# Patient Record
Sex: MALE | Race: WHITE | NOT HISPANIC OR LATINO | Employment: FULL TIME | URBAN - METROPOLITAN AREA
[De-identification: names, ages, dates, MRNs, and addresses within clinical notes are randomized per-mention and may not be internally consistent; named-entity substitution may affect disease eponyms.]

---

## 2017-02-09 ENCOUNTER — THERAPY VISIT (OUTPATIENT)
Dept: PHYSICAL THERAPY | Facility: CLINIC | Age: 47
End: 2017-02-09
Payer: COMMERCIAL

## 2017-02-09 DIAGNOSIS — S86.012D RUPTURE OF ACHILLES TENDON, LEFT, SUBSEQUENT ENCOUNTER: Primary | ICD-10-CM

## 2017-02-09 PROCEDURE — 97110 THERAPEUTIC EXERCISES: CPT | Mod: GP | Performed by: PHYSICAL THERAPIST

## 2017-02-09 NOTE — PROGRESS NOTES
Subjective:    HPI                    Objective:    System    Physical Exam    General     ROS    Assessment/Plan:      PROGRESS  REPORT    Progress reporting period is from 02/09/17.       SUBJECTIVE  Subjective changes noted by patient:  L calf strength is improving. Biggest deficit is calf strength.He is here today to get HEP/workout plan organized to focus on core/LE strength and cardio. Able to walk 30-40 minutes painfree, just feels tired.     Current pain level is NA  .     Previous pain level was  : 0/10.   Changes in function:  Yes (See Goal flowsheet attached for changes in current functional level)  Adverse reaction to treatment or activity: None    OBJECTIVE  Changes noted in objective findings:    Objective: L calf ~90% of size R calf. Able to do single leg toe raise for 3-5 reps.Cued to invert foot slightly to bias medial gastroc.Rev'd HEP on PTRX and instructed to perform HEP 3 x week, and cardio 4-5 x week.      ASSESSMENT/PLAN  Updated problem list and treatment plan: Diagnosis 1:  S/p L achilles repair  Decreased ROM/flexibility - therapeutic exercise  Decreased strength - therapeutic exercise and therapeutic activities  Decreased proprioception - neuro re-education and therapeutic activities  Impaired gait - gait training  Impaired muscle performance - neuro re-education  Decreased function - therapeutic activities  STG/LTGs have been met or progress has been made towards goals:  Yes (See Goal flow sheet completed today.)  Assessment of Progress: The patient's condition is improving.  Self Management Plans:  Patient has been instructed in a home treatment program.  Patient  has been instructed in self management of symptoms.    Alfonso continues to require the following intervention to meet STG and LTG's:  PT    Recommendations:  This patient would benefit from continued therapy.     Frequency:  F/u 1 visit in 6 weeks  Duration:  for 2 visits        Please refer to the daily flowsheet for treatment  today, total treatment time and time spent performing 1:1 timed codes.

## 2017-02-13 ENCOUNTER — TRANSFERRED RECORDS (OUTPATIENT)
Dept: HEALTH INFORMATION MANAGEMENT | Facility: CLINIC | Age: 47
End: 2017-02-13

## 2019-11-04 ENCOUNTER — HEALTH MAINTENANCE LETTER (OUTPATIENT)
Age: 49
End: 2019-11-04

## 2020-07-29 ENCOUNTER — TRANSFERRED RECORDS (OUTPATIENT)
Dept: HEALTH INFORMATION MANAGEMENT | Facility: CLINIC | Age: 50
End: 2020-07-29

## 2020-08-03 ENCOUNTER — TRANSFERRED RECORDS (OUTPATIENT)
Dept: HEALTH INFORMATION MANAGEMENT | Facility: CLINIC | Age: 50
End: 2020-08-03

## 2020-08-04 ENCOUNTER — HOSPITAL ENCOUNTER (OUTPATIENT)
Dept: ULTRASOUND IMAGING | Facility: CLINIC | Age: 50
Discharge: HOME | End: 2020-08-04
Attending: INTERNAL MEDICINE | Admitting: INTERNAL MEDICINE
Payer: COMMERCIAL

## 2020-08-04 DIAGNOSIS — C91.40 HAIRY CELL LEUKEMIA (H): ICD-10-CM

## 2020-08-04 PROCEDURE — 76705 ECHO EXAM OF ABDOMEN: CPT

## 2020-08-10 DIAGNOSIS — Z11.59 ENCOUNTER FOR SCREENING FOR OTHER VIRAL DISEASES: Primary | ICD-10-CM

## 2020-08-14 DIAGNOSIS — Z11.59 ENCOUNTER FOR SCREENING FOR OTHER VIRAL DISEASES: ICD-10-CM

## 2020-08-14 PROCEDURE — U0003 INFECTIOUS AGENT DETECTION BY NUCLEIC ACID (DNA OR RNA); SEVERE ACUTE RESPIRATORY SYNDROME CORONAVIRUS 2 (SARS-COV-2) (CORONAVIRUS DISEASE [COVID-19]), AMPLIFIED PROBE TECHNIQUE, MAKING USE OF HIGH THROUGHPUT TECHNOLOGIES AS DESCRIBED BY CMS-2020-01-R: HCPCS | Performed by: RADIOLOGY

## 2020-08-15 LAB
SARS-COV-2 RNA SPEC QL NAA+PROBE: NOT DETECTED
SPECIMEN SOURCE: NORMAL

## 2020-08-17 ENCOUNTER — APPOINTMENT (OUTPATIENT)
Dept: INTERVENTIONAL RADIOLOGY/VASCULAR | Facility: CLINIC | Age: 50
End: 2020-08-17
Attending: RADIOLOGY
Payer: COMMERCIAL

## 2020-08-17 ENCOUNTER — HOSPITAL ENCOUNTER (OUTPATIENT)
Facility: CLINIC | Age: 50
Discharge: HOME | End: 2020-08-17
Attending: RADIOLOGY | Admitting: RADIOLOGY
Payer: COMMERCIAL

## 2020-08-17 VITALS
RESPIRATION RATE: 14 BRPM | DIASTOLIC BLOOD PRESSURE: 91 MMHG | SYSTOLIC BLOOD PRESSURE: 138 MMHG | WEIGHT: 258 LBS | HEART RATE: 78 BPM | TEMPERATURE: 97.8 F | HEIGHT: 73 IN | BODY MASS INDEX: 34.19 KG/M2

## 2020-08-17 DIAGNOSIS — C80.1 CANCER (H): ICD-10-CM

## 2020-08-17 LAB
ERYTHROCYTE [DISTWIDTH] IN BLOOD BY AUTOMATED COUNT: 14.2 % (ref 10–15)
HCT VFR BLD AUTO: 40.2 % (ref 40–53)
HGB BLD-MCNC: 13.2 G/DL (ref 13.3–17.7)
MCH RBC QN AUTO: 32.8 PG (ref 26.5–33)
MCHC RBC AUTO-ENTMCNC: 32.8 G/DL (ref 31.5–36.5)
MCV RBC AUTO: 100 FL (ref 78–100)
PLATELET # BLD AUTO: 56 10E9/L (ref 150–450)
RBC # BLD AUTO: 4.02 10E12/L (ref 4.4–5.9)
WBC # BLD AUTO: 4.7 10E9/L (ref 4–11)

## 2020-08-17 PROCEDURE — 85027 COMPLETE CBC AUTOMATED: CPT | Performed by: RADIOLOGY

## 2020-08-17 PROCEDURE — 77001 FLUOROGUIDE FOR VEIN DEVICE: CPT

## 2020-08-17 PROCEDURE — C1751 CATH, INF, PER/CENT/MIDLINE: HCPCS

## 2020-08-17 PROCEDURE — 76937 US GUIDE VASCULAR ACCESS: CPT

## 2020-08-17 PROCEDURE — 25000125 ZZHC RX 250: Performed by: RADIOLOGY

## 2020-08-17 PROCEDURE — 36569 INSJ PICC 5 YR+ W/O IMAGING: CPT

## 2020-08-17 RX ORDER — DEXTROSE MONOHYDRATE 25 G/50ML
25-50 INJECTION, SOLUTION INTRAVENOUS
Status: DISCONTINUED | OUTPATIENT
Start: 2020-08-17 | End: 2020-08-17 | Stop reason: HOSPADM

## 2020-08-17 RX ORDER — NICOTINE POLACRILEX 4 MG
15-30 LOZENGE BUCCAL
Status: CANCELLED | OUTPATIENT
Start: 2020-08-17

## 2020-08-17 RX ORDER — PROCHLORPERAZINE MALEATE 10 MG
10 TABLET ORAL EVERY 6 HOURS PRN
COMMUNITY
End: 2023-11-05

## 2020-08-17 RX ORDER — NICOTINE POLACRILEX 4 MG
15-30 LOZENGE BUCCAL
Status: DISCONTINUED | OUTPATIENT
Start: 2020-08-17 | End: 2020-08-17 | Stop reason: HOSPADM

## 2020-08-17 RX ORDER — LIDOCAINE 40 MG/G
CREAM TOPICAL
Status: DISCONTINUED | OUTPATIENT
Start: 2020-08-17 | End: 2020-08-17 | Stop reason: HOSPADM

## 2020-08-17 RX ORDER — DEXTROSE MONOHYDRATE 25 G/50ML
25-50 INJECTION, SOLUTION INTRAVENOUS
Status: CANCELLED | OUTPATIENT
Start: 2020-08-17

## 2020-08-17 RX ORDER — ALLOPURINOL 300 MG/1
300 TABLET ORAL DAILY
COMMUNITY
End: 2020-09-15

## 2020-08-17 RX ORDER — CEFAZOLIN SODIUM IN 0.9 % NACL 3 G/100 ML
3 INTRAVENOUS SOLUTION, PIGGYBACK (ML) INTRAVENOUS
Status: DISCONTINUED | OUTPATIENT
Start: 2020-08-17 | End: 2020-08-17 | Stop reason: CLARIF

## 2020-08-17 RX ORDER — LIDOCAINE HYDROCHLORIDE 10 MG/ML
1-30 INJECTION, SOLUTION EPIDURAL; INFILTRATION; INTRACAUDAL; PERINEURAL
Status: COMPLETED | OUTPATIENT
Start: 2020-08-17 | End: 2020-08-17

## 2020-08-17 RX ORDER — LIDOCAINE HYDROCHLORIDE 10 MG/ML
INJECTION, SOLUTION EPIDURAL; INFILTRATION; INTRACAUDAL; PERINEURAL
Status: DISCONTINUED
Start: 2020-08-17 | End: 2020-08-17 | Stop reason: HOSPADM

## 2020-08-17 RX ORDER — SULFAMETHOXAZOLE/TRIMETHOPRIM 800-160 MG
1 TABLET ORAL DAILY
Status: ON HOLD | COMMUNITY
End: 2020-09-21

## 2020-08-17 RX ADMIN — LIDOCAINE HYDROCHLORIDE 3 ML: 10 INJECTION, SOLUTION EPIDURAL; INFILTRATION; INTRACAUDAL; PERINEURAL at 09:20

## 2020-08-17 ASSESSMENT — MIFFLIN-ST. JEOR: SCORE: 2089.16

## 2020-08-17 NOTE — DISCHARGE INSTRUCTIONS
Discharge Instructions: Caring for Your Central Line  You are going home with a central line. It s also called a central venous access device (CVAD) or central venous catheter (CVC). A small, soft tube (catheter) has been put in a vein that leads to your heart. This provides medicine during your treatment. It is taken out when you no longer need it. At home, you need to take care of your central line to keep it working. A central line has a high infection risk. So you must take extra care washing your hands and preventing the spread of germs. This sheet will help you remember what to do at home.  Understanding your role    A nurse or other healthcare provider will teach you and your caregivers how to care for the central line. Before leaving the hospital, make sure you understand what to do at home, how long you may need the central line, and when to have a follow-up visit.    You will likely be told to flush the central line with saline or heparin solution. You may also be told to change the catheter s injection caps and change the bandage (dressing). Or, a nurse may do this for you during a follow-up visit. Only do these things if you re told to do so. Follow the instructions you were given.    Protecting the central line  If the central line gets damaged, it won t work right and could raise your chance of infection. Call your healthcare team right away if any damage occurs. To protect the central line at home:    Prevent infection. Use good hand hygiene by following the guidelines on this sheet. Don t touch the catheter or dressing unless you need to. And always clean your hands before and after you come in contact with any part of the central line. Your caregivers, family members, and any visitors should use good hand hygiene, too.    Keep the central line dry. The catheter and dressing must stay dry. Don t take baths, go swimming, use a hot tub, or do other activities that could get the central line wet. Take  a sponge bath to avoid getting the central line wet, unless your healthcare provider tells you otherwise. Ask your provider about the best way to keep the line dry when bathing or showering. If the dressing does get wet, change it only if you have been shown how. Otherwise, call your healthcare team right away for help. Have clean or sterile gloves available if you will be changing the dressings over the catheter.    Avoid damage. Don t use any sharp or pointy objects around the catheter. This includes scissors, pins, knives, razors, or anything else that could cut it or put a hole in it (puncture it). Also, don t let anything pull or rub on the catheter, such as clothing.    Watch for signs of problems. Pay attention to how much of the catheter sticks out from your skin. If this changes at all, let your healthcare provider know. Also watch for cracks, leaks, or other damage. If the dressing becomes dirty, loose, or wet, change it (if you have been instructed to). Or call your healthcare team right away.    Avoid lowering your chest below your waist. This includes bending at the waist to do things like tying your shoes. When your chest is below your waist, especially for a long time, the catheter s internal tip could slip out of place in the vein.    Tell your healthcare team if you vomit or have severe coughing. This can also make the catheter slip out of place.  Risk of blood clot  If a blood clot forms it can block blood flow through the vein where the catheter is placed. Signs of a blood clot include pain or swelling in your neck, face, chest or arm. If you have any of these symptoms, call your healthcare provider right away. You may need an ultrasound exam to find the blood clot. You may also be treated with a blood thinner.    A central line can let germs into your body. This can lead to serious and sometimes deadly infections. To prevent infection, it s very important that you, your caregivers, and others  around you use good hand hygiene. This means washing your hands well with soap and water, and cleaning them with alcohol-based hand gel as directed. Never touch the central line or dressing without first using one of these methods.  To wash your hands with soap and water:  1. Wet your hands with warm water. (Avoid hot water. It can cause skin irritation when you wash your hands often.)  2. Apply enough soap to cover the entire surface of your hands, including your fingers.  3. Rub your hands together briskly for at least 15 seconds. Make sure to rub the front and back of each hand up to the wrist, your fingers and fingernails, between the fingers, and each thumb.  4. Rinse your hands with warm water.  5. Dry your hands completely with a new, unused paper towel. Don t use a cloth towel or other reusable towel. These can harbor germs.  6. Use the paper towel to turn off the faucet, then throw it away. If you re in a bathroom, also use a paper towel to open the door instead of touching the handle.  When you don t have access to soap and water: Use alcohol-based hand gel to clean your hands. The gel should have at least 60% alcohol. Follow the instructions on the package. Your healthcare team can answer any questions you have about when to use hand gel, or when it s better to wash with soap and water.  Call your healthcare provider right away if you have any of the following:    Pain or burning in your shoulder, chest, back, arm, or leg    Fever of 100.4 F (38.0 C) or higher    Chills    Signs of infection at the catheter site (pain, redness, drainage, burning, or stinging)    Coughing, wheezing, or shortness of breath    A racing or irregular heartbeat    Muscle stiffness or trouble moving    Gurgling noises coming from the catheter    The catheter falls out, breaks, cracks, leaks, or has other damage  Date Last Reviewed: 7/1/2016 2000-2019 [employer]. [address] [place] [address]. All  rights reserved. This information is not intended as a substitute for professional medical care. Always follow your healthcare professional's instructions.

## 2020-08-17 NOTE — IP AVS SNAPSHOT
MRN:[id number]                      After Visit Summary   8/17/2020    Alfonso Kendrick    MRN: [id number]           Visit Information        Department      8/17/2020  7:45 AM [facility]          Review of your medicines      UNREVIEWED medicines. Ask your doctor about these medicines       Dose / Directions   Acetaminophen 325 MG Caps      Dose:  325-650 mg  Take 325-650 mg by mouth every 4 hours as needed  Refills:  0     allopurinol 300 MG tablet  Commonly known as:  ZYLOPRIM      Dose:  300 mg  Take 300 mg by mouth daily  Refills:  0     Multivitamin Tabs      Refills:  0     prochlorperazine 10 MG tablet  Commonly known as:  COMPAZINE      Dose:  10 mg  Take 10 mg by mouth every 6 hours as needed for nausea or vomiting  Refills:  0     sulfamethoxazole-trimethoprim 800-160 MG tablet  Commonly known as:  BACTRIM DS      Dose:  1 tablet  Take 1 tablet by mouth 2 times daily  Refills:  0        CONTINUE these medicines which have NOT CHANGED       Dose / Directions   * order for DME  Used for:  Achilles tendon injury, left, initial encounter      Equipment being ordered: walking boot  Quantity:  1 Units  Refills:  0     * order for DME  Used for:  Achilles tendon injury, left, initial encounter      Equipment being ordered: crutches  Quantity:  1 Units  Refills:  0     * order for DME  Used for:  Achilles rupture, left, subsequent encounter      Equipment being ordered: RollAbout x 3 months  Quantity:  1 Device  Refills:  0     * order for DME  Used for:  Achilles rupture, left, subsequent encounter      Equipment being ordered: shower seat  Quantity:  1 Device  Refills:  0     * order for DME  Used for:  S/P Achilles tendon repair      Equipment being ordered: triloc ankle brace  Quantity:  1 Device  Refills:  0         * This list has 5 medication(s) that are the same as other medications prescribed for you. Read the directions carefully, and ask your doctor or other care  provider to review them with you.                  Protect others around you: Learn how to safely use, store and throw away your medicines at www.disposemymeds.org.       Follow-ups after your visit       Care Instructions       Further instructions from your care team         Discharge Instructions: Caring for Your Central Line  You are going home with a central line. It s also called a central venous access device (CVAD) or central venous catheter (CVC). A small, soft tube (catheter) has been put in a vein that leads to your heart. This provides medicine during your treatment. It is taken out when you no longer need it. At home, you need to take care of your central line to keep it working. A central line has a high infection risk. So you must take extra care washing your hands and preventing the spread of germs. This sheet will help you remember what to do at home.  Understanding your role    A nurse or other healthcare provider will teach you and your caregivers how to care for the central line. Before leaving the hospital, make sure you understand what to do at home, how long you may need the central line, and when to have a follow-up visit.    You will likely be told to flush the central line with saline or heparin solution. You may also be told to change the catheter s injection caps and change the bandage (dressing). Or, a nurse may do this for you during a follow-up visit. Only do these things if you re told to do so. Follow the instructions you were given.    Protecting the central line  If the central line gets damaged, it won t work right and could raise your chance of infection. Call your healthcare team right away if any damage occurs. To protect the central line at home:    Prevent infection. Use good hand hygiene by following the guidelines on this sheet. Don t touch the catheter or dressing unless you need to. And always clean your hands before and after you come in contact with any part of the  central line. Your caregivers, family members, and any visitors should use good hand hygiene, too.    Keep the central line dry. The catheter and dressing must stay dry. Don t take baths, go swimming, use a hot tub, or do other activities that could get the central line wet. Take a sponge bath to avoid getting the central line wet, unless your healthcare provider tells you otherwise. Ask your provider about the best way to keep the line dry when bathing or showering. If the dressing does get wet, change it only if you have been shown how. Otherwise, call your healthcare team right away for help. Have clean or sterile gloves available if you will be changing the dressings over the catheter.    Avoid damage. Don t use any sharp or pointy objects around the catheter. This includes scissors, pins, knives, razors, or anything else that could cut it or put a hole in it (puncture it). Also, don t let anything pull or rub on the catheter, such as clothing.    Watch for signs of problems. Pay attention to how much of the catheter sticks out from your skin. If this changes at all, let your healthcare provider know. Also watch for cracks, leaks, or other damage. If the dressing becomes dirty, loose, or wet, change it (if you have been instructed to). Or call your healthcare team right away.    Avoid lowering your chest below your waist. This includes bending at the waist to do things like tying your shoes. When your chest is below your waist, especially for a long time, the catheter s internal tip could slip out of place in the vein.    Tell your healthcare team if you vomit or have severe coughing. This can also make the catheter slip out of place.  Risk of blood clot  If a blood clot forms it can block blood flow through the vein where the catheter is placed. Signs of a blood clot include pain or swelling in your neck, face, chest or arm. If you have any of these symptoms, call your healthcare provider right away. You may  need an ultrasound exam to find the blood clot. You may also be treated with a blood thinner.    A central line can let germs into your body. This can lead to serious and sometimes deadly infections. To prevent infection, it s very important that you, your caregivers, and others around you use good hand hygiene. This means washing your hands well with soap and water, and cleaning them with alcohol-based hand gel as directed. Never touch the central line or dressing without first using one of these methods.  To wash your hands with soap and water:  1. Wet your hands with warm water. (Avoid hot water. It can cause skin irritation when you wash your hands often.)  2. Apply enough soap to cover the entire surface of your hands, including your fingers.  3. Rub your hands together briskly for at least 15 seconds. Make sure to rub the front and back of each hand up to the wrist, your fingers and fingernails, between the fingers, and each thumb.  4. Rinse your hands with warm water.  5. Dry your hands completely with a new, unused paper towel. Don t use a cloth towel or other reusable towel. These can harbor germs.  6. Use the paper towel to turn off the faucet, then throw it away. If you re in a bathroom, also use a paper towel to open the door instead of touching the handle.  When you don t have access to soap and water: Use alcohol-based hand gel to clean your hands. The gel should have at least 60% alcohol. Follow the instructions on the package. Your healthcare team can answer any questions you have about when to use hand gel, or when it s better to wash with soap and water.  Call your healthcare provider right away if you have any of the following:    Pain or burning in your shoulder, chest, back, arm, or leg    Fever of 100.4 F (38.0 C) or higher    Chills    Signs of infection at the catheter site (pain, redness, drainage, burning, or stinging)    Coughing, wheezing, or shortness of breath    A racing or irregular  "heartbeat    Muscle stiffness or trouble moving    Gurgling noises coming from the catheter    The catheter falls out, breaks, cracks, leaks, or has other damage  Date Last Reviewed: 7/1/2016 2000-2019 [employer]. [address] [place] [address], [place] [address]. All rights reserved. This information is not intended as a substitute for professional medical care. Always follow your healthcare professional's instructions.          Additional Information About Your Visit       MyChart Information    [employer] gives you secure access to your electronic health record. If you see a primary care provider, you can also send messages to your care team and make appointments. If you have questions, please call your primary care clinic.  If you do not have a primary care provider, please call [phone] and they will assist you.       Care EveryWhere ID    This is your Care EveryWhere ID. This could be used by other organizations to access your [facility] medical records  [id number]       Your Vitals Were  Most recent update: 8/17/2020  8:21 AM    Blood Pressure   138/91   (BP Location: Right arm)          Pulse   78          Temperature   97.8  F (36.6  C) (Temporal)          Respirations   14          Height   1.854 m (6' 1\")             Weight   117 kg (258 lb)    BMI (Body Mass Index)   34.04 kg/m           Primary Care Provider Office Phone # Fax #    Chuy Guzman [phone]      Equal Access to Services    [facility]: Hadii evelyn ku hadasho Soomaali, waaxda luqadaha, qaybta kaalmada adeegyada, lenard iglesias . So [facility] [phone].    ATENCIÓN: Si habla español, tiene a west disposición servicios gratuitos de asistencia lingüística. Ajay al [phone].    We comply with applicable federal and state civil rights laws, including the [place] Human Rights Act. We do not discriminate on the basis of race, color, creed, Oriental orthodox, national origin, marital status, " age, disability, sex, sexual orientation, or gender identity.       Thank you!    Thank you for choosing [facility] for your care. Our goal is always to provide you with excellent care. Hearing back from our patients is one way we can continue to improve our services. Please take a few minutes to complete the written survey that you may receive in the mail after you visit. If you would like to speak to someone directly about your visit please contact Patient Relations at [phone]. Thank you!              Medication List      Medications          Morning Afternoon Evening Bedtime As Needed    * order for DME  INSTRUCTIONS:  Equipment being ordered: walking boot                     * order for DME  INSTRUCTIONS:  Equipment being ordered: crutches                     * order for DME  INSTRUCTIONS:  Equipment being ordered: RollAbout x 3 months                     * order for DME  INSTRUCTIONS:  Equipment being ordered: shower seat                     * order for DME  INSTRUCTIONS:  Equipment being ordered: triloc ankle brace                        * This list has 5 medication(s) that are the same as other medications prescribed for you. Read the directions carefully, and ask your doctor or other care provider to review them with you.            ASK your doctor about these medications          Morning Afternoon Evening Bedtime As Needed    Acetaminophen 325 MG Caps  INSTRUCTIONS:  Take 325-650 mg by mouth every 4 hours as needed                     allopurinol 300 MG tablet  Also known as:  ZYLOPRIM  INSTRUCTIONS:  Take 300 mg by mouth daily                     Multivitamin Tabs                     prochlorperazine 10 MG tablet  Also known as:  COMPAZINE  INSTRUCTIONS:  Take 10 mg by mouth every 6 hours as needed for nausea or vomiting                     sulfamethoxazole-trimethoprim 800-160 MG tablet  Also known as:  BACTRIM DS  INSTRUCTIONS:  Take 1 tablet by mouth 2 times daily

## 2020-08-17 NOTE — IP AVS SNAPSHOT
[facility]  [address]  [facility] [address]  Phone:  [phone]                                    After Visit Summary   8/17/2020    Alfonso Kendrick    MRN: [id number]           After Visit Summary Signature Page    I have received my discharge instructions, and my questions have been answered. I have discussed any challenges I see with this plan with the nurse or doctor.    ..........................................................................................................................................  Patient/Patient Representative Signature      ..........................................................................................................................................  Patient Representative Print Name and Relationship to Patient    ..................................................               ................................................  Date                                   Time    ..........................................................................................................................................  Reviewed by Signature/Title    ...................................................              ..............................................  Date                                               Time          22EPIC Rev 08/18

## 2020-08-17 NOTE — SEDATION DOCUMENTATION
After patient got dressed, picc site had a little ooze from insertion site. Slight manual pressure held, dressing changed and coban applied to outer sterile dressing for enforcement. Patient denies any pain or discomfort. Arm circumference remains the same 36cm. Patient stated he would leave the dressing as is until his appointment on Wednesday. No further questions at this time. Patient has follow up phones as needed. Discharged to home with daughter.

## 2020-08-17 NOTE — PROCEDURES
[facility]    Procedure: RUE PICC    Date/Time: 8/17/2020 9:43 AM  Performed by: Saurabh Mitchell MD  Authorized by: Saurabh Mitchell MD     UNIVERSAL PROTOCOL   Site Marked: NA  Prior Images Obtained and Reviewed:  Yes  Required items: Required blood products, implants, devices and special equipment available    Patient identity confirmed:  Verbally with patient, arm band, provided demographic data and hospital-assigned identification number  Patient was reevaluated immediately before administering moderate or deep sedation or anesthesia  Confirmation Checklist:  Patient's identity using two indicators, relevant allergies, procedure was appropriate and matched the consent or emergent situation and correct equipment/implants were available  Time out: Immediately prior to the procedure a time out was called    Universal Protocol: the Joint Commission Universal Protocol was followed    Preparation: Patient was prepped and draped in usual sterile fashion    ESBL (mL):  2         ANESTHESIA    Anesthesia: Local infiltration  Local Anesthetic:  Lidocaine 1% without epinephrine      SEDATION    Patient Sedated: No    See dictated procedure note for full details.  Findings: RUE dual lumen Power PICC with tip at the RA/SVC Junction.  Both lumens aspirated and locked with normal saline.  No complications.  May use immediately.    Specimens: none    Complications: None    Condition: Stable    PROCEDURE   Patient Tolerance:  Patient tolerated the procedure well with no immediate complications    Length of time physician/provider present for 1:1 monitoring during sedation: 0

## 2020-08-19 ENCOUNTER — TRANSFERRED RECORDS (OUTPATIENT)
Dept: HEALTH INFORMATION MANAGEMENT | Facility: CLINIC | Age: 50
End: 2020-08-19

## 2020-08-27 ENCOUNTER — TELEPHONE (OUTPATIENT)
Dept: FAMILY MEDICINE | Facility: CLINIC | Age: 50
End: 2020-08-27

## 2020-08-27 DIAGNOSIS — Z20.822 SUSPECTED COVID-19 VIRUS INFECTION: Primary | ICD-10-CM

## 2020-08-27 NOTE — TELEPHONE ENCOUNTER
Ordered PCR.  We should see him virtually when able to touch base with his recent recurrence of Hairy Cell Leukemia.  SB#3 with new Dx.    Make sure he is not having ongoing fever - if having more fevers with low WBC he may need to go to hospital for workup and treatment of neutropenic fever - I don't know what his current status is in terms of his cancer/chemo treatment.  Any recent notes would be helpful - can place on my desk for review if obtained from [facility].

## 2020-08-27 NOTE — TELEPHONE ENCOUNTER
Tamara from [facility] is calling to see if Dr. Guzman will order a Covid test for patient. Patient has been seen by them and his symptoms were fevers, sweating, and  chills. She states when they saw patient his temperature was 98.8 but patient states when he was taking his temperature at home it was 101. They would order the test but states it needs to come from patient's primary provider. Dr. Guzman hasn't seen patient since 06/1/2016    Tamara can be reached at [phone].    Mary Lemons

## 2020-08-28 NOTE — TELEPHONE ENCOUNTER
Let's do the 1:00 Same day on 9/4/2020 virtual ok, just make sure we get his [facility] notes for review at that visit in PVP planning.

## 2020-08-28 NOTE — TELEPHONE ENCOUNTER
Patient has had ongoing fevers but the oncologist wants him to stay out of the hospital if possible due to higher risk of infection. Patient states he goes to see his oncologist on Tuesday and Wednesday of next week. Records are in media of patient chart from oncology.    Patient would like to set up virtual visit with Chuy Guzman next week but only slots available are same day or short approval required slots.    Please advise.    Sofya BOURGEOIS RN, BSN

## 2020-08-29 DIAGNOSIS — Z20.822 SUSPECTED COVID-19 VIRUS INFECTION: Primary | ICD-10-CM

## 2020-09-15 ENCOUNTER — APPOINTMENT (OUTPATIENT)
Dept: GENERAL RADIOLOGY | Facility: CLINIC | Age: 50
DRG: 808 | End: 2020-09-15
Attending: PHYSICIAN ASSISTANT
Payer: COMMERCIAL

## 2020-09-15 ENCOUNTER — HOSPITAL ENCOUNTER (INPATIENT)
Facility: CLINIC | Age: 50
LOS: 6 days | Discharge: HOME | DRG: 808 | End: 2020-09-21
Attending: PHYSICIAN ASSISTANT | Admitting: INTERNAL MEDICINE
Payer: COMMERCIAL

## 2020-09-15 DIAGNOSIS — D70.9 NEUTROPENIC FEVER (H): ICD-10-CM

## 2020-09-15 DIAGNOSIS — R50.81 NEUTROPENIC FEVER (H): ICD-10-CM

## 2020-09-15 DIAGNOSIS — C91.40 HAIRY CELL LEUKEMIA (H): ICD-10-CM

## 2020-09-15 DIAGNOSIS — D70.9 FEBRILE NEUTROPENIA (H): Primary | ICD-10-CM

## 2020-09-15 DIAGNOSIS — R50.81 FEBRILE NEUTROPENIA (H): Primary | ICD-10-CM

## 2020-09-15 LAB
ALBUMIN SERPL-MCNC: 2.6 G/DL (ref 3.4–5)
ALBUMIN UR-MCNC: 300 MG/DL
ALP SERPL-CCNC: 64 U/L (ref 40–150)
ALT SERPL W P-5'-P-CCNC: 40 U/L (ref 0–70)
ANION GAP SERPL CALCULATED.3IONS-SCNC: 5 MMOL/L (ref 3–14)
APPEARANCE UR: CLEAR
APTT PPP: 46 SEC (ref 22–37)
AST SERPL W P-5'-P-CCNC: 76 U/L (ref 0–45)
BILIRUB SERPL-MCNC: 0.7 MG/DL (ref 0.2–1.3)
BILIRUB UR QL STRIP: NEGATIVE
BUN SERPL-MCNC: 24 MG/DL (ref 7–30)
CALCIUM SERPL-MCNC: 8.3 MG/DL (ref 8.5–10.1)
CHLORIDE SERPL-SCNC: 102 MMOL/L (ref 94–109)
CO2 SERPL-SCNC: 25 MMOL/L (ref 20–32)
COLOR UR AUTO: YELLOW
CREAT SERPL-MCNC: 1.21 MG/DL (ref 0.66–1.25)
D DIMER PPP FEU-MCNC: 12.8 UG/ML FEU (ref 0–0.5)
DIFFERENTIAL METHOD BLD: ABNORMAL
ERYTHROCYTE [DISTWIDTH] IN BLOOD BY AUTOMATED COUNT: 12.5 % (ref 10–15)
FIBRINOGEN PPP-MCNC: 643 MG/DL (ref 200–420)
GFR SERPL CREATININE-BSD FRML MDRD: 70 ML/MIN/{1.73_M2}
GLUCOSE SERPL-MCNC: 145 MG/DL (ref 70–99)
GLUCOSE UR STRIP-MCNC: 30 MG/DL
HCT VFR BLD AUTO: 27 % (ref 40–53)
HGB BLD-MCNC: 9.2 G/DL (ref 13.3–17.7)
HGB UR QL STRIP: ABNORMAL
INR PPP: 1.79 (ref 0.86–1.14)
KETONES UR STRIP-MCNC: ABNORMAL MG/DL
LACTATE BLD-SCNC: 1.6 MMOL/L (ref 0.7–2)
LEUKOCYTE ESTERASE UR QL STRIP: NEGATIVE
MCH RBC QN AUTO: 33 PG (ref 26.5–33)
MCHC RBC AUTO-ENTMCNC: 34.1 G/DL (ref 31.5–36.5)
MCV RBC AUTO: 97 FL (ref 78–100)
MUCOUS THREADS #/AREA URNS LPF: PRESENT /LPF
NITRATE UR QL: NEGATIVE
PH UR STRIP: 6 PH (ref 5–7)
PLATELET # BLD AUTO: 86 10E9/L (ref 150–450)
POTASSIUM SERPL-SCNC: 4.1 MMOL/L (ref 3.4–5.3)
PROT SERPL-MCNC: 6.4 G/DL (ref 6.8–8.8)
RBC # BLD AUTO: 2.79 10E12/L (ref 4.4–5.9)
RBC #/AREA URNS AUTO: 1 /HPF (ref 0–2)
SARS-COV-2 RNA SPEC QL NAA+PROBE: NORMAL
SODIUM SERPL-SCNC: 132 MMOL/L (ref 133–144)
SOURCE: ABNORMAL
SP GR UR STRIP: 1.03 (ref 1–1.03)
SPECIMEN SOURCE: NORMAL
UROBILINOGEN UR STRIP-MCNC: 2 MG/DL (ref 0–2)
WBC # BLD AUTO: 0.2 10E9/L (ref 4–11)
WBC #/AREA URNS AUTO: 4 /HPF (ref 0–5)

## 2020-09-15 PROCEDURE — 96365 THER/PROPH/DIAG IV INF INIT: CPT

## 2020-09-15 PROCEDURE — 99223 1ST HOSP IP/OBS HIGH 75: CPT | Mod: AI | Performed by: INTERNAL MEDICINE

## 2020-09-15 PROCEDURE — 25000128 H RX IP 250 OP 636: Performed by: PHYSICIAN ASSISTANT

## 2020-09-15 PROCEDURE — 87040 BLOOD CULTURE FOR BACTERIA: CPT | Performed by: PHYSICIAN ASSISTANT

## 2020-09-15 PROCEDURE — 81001 URINALYSIS AUTO W/SCOPE: CPT | Performed by: PHYSICIAN ASSISTANT

## 2020-09-15 PROCEDURE — 93005 ELECTROCARDIOGRAM TRACING: CPT

## 2020-09-15 PROCEDURE — 85384 FIBRINOGEN ACTIVITY: CPT | Performed by: PHYSICIAN ASSISTANT

## 2020-09-15 PROCEDURE — 83605 ASSAY OF LACTIC ACID: CPT | Performed by: PHYSICIAN ASSISTANT

## 2020-09-15 PROCEDURE — 96361 HYDRATE IV INFUSION ADD-ON: CPT

## 2020-09-15 PROCEDURE — 85610 PROTHROMBIN TIME: CPT | Performed by: PHYSICIAN ASSISTANT

## 2020-09-15 PROCEDURE — C9803 HOPD COVID-19 SPEC COLLECT: HCPCS

## 2020-09-15 PROCEDURE — 85379 FIBRIN DEGRADATION QUANT: CPT | Performed by: PHYSICIAN ASSISTANT

## 2020-09-15 PROCEDURE — 12000000 ZZH R&B MED SURG/OB

## 2020-09-15 PROCEDURE — U0003 INFECTIOUS AGENT DETECTION BY NUCLEIC ACID (DNA OR RNA); SEVERE ACUTE RESPIRATORY SYNDROME CORONAVIRUS 2 (SARS-COV-2) (CORONAVIRUS DISEASE [COVID-19]), AMPLIFIED PROBE TECHNIQUE, MAKING USE OF HIGH THROUGHPUT TECHNOLOGIES AS DESCRIBED BY CMS-2020-01-R: HCPCS | Performed by: PHYSICIAN ASSISTANT

## 2020-09-15 PROCEDURE — 96375 TX/PRO/DX INJ NEW DRUG ADDON: CPT

## 2020-09-15 PROCEDURE — 99285 EMERGENCY DEPT VISIT HI MDM: CPT | Mod: 25

## 2020-09-15 PROCEDURE — 80053 COMPREHEN METABOLIC PANEL: CPT | Performed by: PHYSICIAN ASSISTANT

## 2020-09-15 PROCEDURE — 96367 TX/PROPH/DG ADDL SEQ IV INF: CPT

## 2020-09-15 PROCEDURE — 36415 COLL VENOUS BLD VENIPUNCTURE: CPT | Performed by: PHYSICIAN ASSISTANT

## 2020-09-15 PROCEDURE — 85730 THROMBOPLASTIN TIME PARTIAL: CPT | Performed by: PHYSICIAN ASSISTANT

## 2020-09-15 PROCEDURE — 71045 X-RAY EXAM CHEST 1 VIEW: CPT

## 2020-09-15 PROCEDURE — 25800030 ZZH RX IP 258 OP 636: Performed by: PHYSICIAN ASSISTANT

## 2020-09-15 PROCEDURE — 96366 THER/PROPH/DIAG IV INF ADDON: CPT

## 2020-09-15 PROCEDURE — 85027 COMPLETE CBC AUTOMATED: CPT

## 2020-09-15 RX ORDER — METHYLPREDNISOLONE 4 MG
TABLET, DOSE PACK ORAL SEE ADMIN INSTRUCTIONS
COMMUNITY
End: 2023-11-05

## 2020-09-15 RX ORDER — ACYCLOVIR 400 MG/1
400 TABLET ORAL EVERY 12 HOURS
COMMUNITY
End: 2023-11-05

## 2020-09-15 RX ORDER — LANOLIN ALCOHOL/MO/W.PET/CERES
3 CREAM (GRAM) TOPICAL
Status: DISCONTINUED | OUTPATIENT
Start: 2020-09-15 | End: 2020-09-21 | Stop reason: HOSPADM

## 2020-09-15 RX ORDER — ONDANSETRON 2 MG/ML
4 INJECTION INTRAMUSCULAR; INTRAVENOUS EVERY 6 HOURS PRN
Status: DISCONTINUED | OUTPATIENT
Start: 2020-09-15 | End: 2020-09-21 | Stop reason: HOSPADM

## 2020-09-15 RX ORDER — LIDOCAINE 40 MG/G
CREAM TOPICAL
Status: DISCONTINUED | OUTPATIENT
Start: 2020-09-15 | End: 2020-09-21 | Stop reason: HOSPADM

## 2020-09-15 RX ORDER — NALOXONE HYDROCHLORIDE 0.4 MG/ML
.1-.4 INJECTION, SOLUTION INTRAMUSCULAR; INTRAVENOUS; SUBCUTANEOUS
Status: DISCONTINUED | OUTPATIENT
Start: 2020-09-15 | End: 2020-09-21 | Stop reason: HOSPADM

## 2020-09-15 RX ORDER — ACETAMINOPHEN 325 MG/1
650 TABLET ORAL EVERY 4 HOURS PRN
Status: DISCONTINUED | OUTPATIENT
Start: 2020-09-15 | End: 2020-09-16

## 2020-09-15 RX ORDER — ONDANSETRON 4 MG/1
4 TABLET, ORALLY DISINTEGRATING ORAL EVERY 6 HOURS PRN
Status: DISCONTINUED | OUTPATIENT
Start: 2020-09-15 | End: 2020-09-21 | Stop reason: HOSPADM

## 2020-09-15 RX ORDER — CEFAZOLIN SODIUM 1 G/50ML
1750 SOLUTION INTRAVENOUS EVERY 12 HOURS
Status: DISCONTINUED | OUTPATIENT
Start: 2020-09-16 | End: 2020-09-17

## 2020-09-15 RX ORDER — TEMAZEPAM 15 MG/1
15 CAPSULE ORAL
COMMUNITY
End: 2023-11-05

## 2020-09-15 RX ORDER — MULTIVIT-MIN/IRON/FOLIC ACID/K 18-600-40
1 CAPSULE ORAL DAILY
COMMUNITY
End: 2023-11-05

## 2020-09-15 RX ORDER — POLYETHYLENE GLYCOL 3350 17 G/17G
17 POWDER, FOR SOLUTION ORAL DAILY PRN
Status: DISCONTINUED | OUTPATIENT
Start: 2020-09-15 | End: 2020-09-21 | Stop reason: HOSPADM

## 2020-09-15 RX ORDER — LACTOBACILLUS RHAMNOSUS GG 10B CELL
1 CAPSULE ORAL DAILY
COMMUNITY
End: 2023-11-05

## 2020-09-15 RX ORDER — SODIUM CHLORIDE AND POTASSIUM CHLORIDE 150; 900 MG/100ML; MG/100ML
INJECTION, SOLUTION INTRAVENOUS CONTINUOUS
Status: DISCONTINUED | OUTPATIENT
Start: 2020-09-15 | End: 2020-09-18

## 2020-09-15 RX ORDER — KETOROLAC TROMETHAMINE 15 MG/ML
15 INJECTION, SOLUTION INTRAMUSCULAR; INTRAVENOUS ONCE
Status: COMPLETED | OUTPATIENT
Start: 2020-09-15 | End: 2020-09-15

## 2020-09-15 RX ORDER — LEVOFLOXACIN 500 MG/1
500 TABLET, FILM COATED ORAL DAILY
Status: ON HOLD | COMMUNITY
End: 2020-09-21

## 2020-09-15 RX ADMIN — VANCOMYCIN HYDROCHLORIDE 2500 MG: 1 INJECTION, POWDER, LYOPHILIZED, FOR SOLUTION INTRAVENOUS at 20:03

## 2020-09-15 RX ADMIN — TAZOBACTAM SODIUM AND PIPERACILLIN SODIUM 4.5 G: 500; 4 INJECTION, SOLUTION INTRAVENOUS at 19:04

## 2020-09-15 RX ADMIN — SODIUM CHLORIDE 1000 ML: 9 INJECTION, SOLUTION INTRAVENOUS at 17:35

## 2020-09-15 RX ADMIN — KETOROLAC TROMETHAMINE 15 MG: 15 INJECTION, SOLUTION INTRAMUSCULAR; INTRAVENOUS at 19:01

## 2020-09-15 ASSESSMENT — MIFFLIN-ST. JEOR
SCORE: 2042.44
SCORE: 2027.92

## 2020-09-15 ASSESSMENT — ENCOUNTER SYMPTOMS
RHINORRHEA: 1
VOMITING: 0
NAUSEA: 0
SHORTNESS OF BREATH: 0
FEVER: 1

## 2020-09-15 NOTE — ED NOTES
.  [facility]  ED Nurse Handoff Report    Alfonso Kendrick is a 50 year old male   ED Chief complaint: Fever  . ED Diagnosis:   Final diagnoses:   None     Allergies: No Known Allergies    Code Status: Full Code  Activity level - Baseline/Home:  Independent. Activity Level - Current:   Stand by Assist. Lift room needed: No. Bariatric: No   Needed: No   Isolation: Yes. Infection: Other .     Vital Signs:   Vitals:    09/15/20 1800 09/15/20 1815 09/15/20 1830 09/15/20 1843   BP: 115/72 113/70     Pulse: 126 123 126    Resp: 27 29 24    Temp:    103  F (39.4  C)   TempSrc:    Oral   SpO2: 99% 99% 99%    Weight:       Height:           Cardiac Rhythm:  ,      Pain level: 0-10 Pain Scale: 0  Patient confused: No. Patient Falls Risk: Yes.   Elimination Status: Has voided   Patient Report - Initial Complaint:Alfonso Kendrick is a 50 year old male on Eliquis with a history of hairy cell leukemia who presents with fever. The patient complains of a fever and rhinorrhea that began on Saturday (09/12/2020). The patient has been treating the symptoms with Tylenol every four hours, with last dose at 1400. He is unable to take ibuprofen due to blood thinner use. He is currently undergoing chemotherapy for hairy cell leukemia, with his last treatment on Friday (09/11/2020). His oncologist possibly attributes an additional symptom of generalized rash to the treatment. This began last Wednesday (09/09/2020). Denies any shortness of breath, chest pain, cough, abdominal pain, nausea, vomiting, or urinary symptoms.  . Focused Assessment: Patient is neutropenic with a fever. Neutropenic precautions please. In the ER pt is alert and oriented times four. Pleasant and cooperative. Continues to be febrile. Given po. PICC line is at NS TKO 50cc/hour.   Tests Performed: .  Abnormal Labs Reviewed   CBC WITH PLATELETS DIFFERENTIAL - Abnormal; Notable for the following components:       Result Value    WBC 0.2 (*)     RBC  Count 2.79 (*)     Hemoglobin 9.2 (*)     Hematocrit 27.0 (*)     Platelet Count 86 (*)     All other components within normal limits   COMPREHENSIVE METABOLIC PANEL - Abnormal; Notable for the following components:    Sodium 132 (*)     Glucose 145 (*)     Calcium 8.3 (*)     Albumin 2.6 (*)     Protein Total 6.4 (*)     AST 76 (*)     All other components within normal limits   . Abnormal Results: .  XR Chest Port 1 View   Final Result   IMPRESSION: Right-sided PICC line with tip over atrial caval junction. No pneumothorax or pleural effusion. No focal consolidation. Cardiac silhouette within normal limits. No acute osseous abnormality.      .   Treatments provided: Lab draw, PICC access, Meds,   Family Comments: Not present  OBS brochure/video discussed/provided to patient:  No  ED Medications:   Medications   ketorolac (TORADOL) injection 15 mg (has no administration in time range)   0.9% sodium chloride BOLUS (0 mLs Intravenous Stopped 9/15/20 1830)     Drips infusing:  Yes  For the majority of the shift, the patient's behavior Green. Interventions performed were none.    Sepsis treatment initiated: No     Patient tested for COVID 19 prior to admission: YES    ED Nurse Name/Phone Number: Lo Machado RN,   6:45 PM      RECEIVING UNIT ED HANDOFF REVIEW    Above ED Nurse Handoff Report was reviewed: Yes  Reviewed by: Inocencia Alonzo RN on September 15, 2020 at 9:57 PM

## 2020-09-15 NOTE — ED TRIAGE NOTES
Pt presents for evaluation of fevers since Saturday. Has been taking tylenol around the clock, every 4 hours. Last dose was around 1400. Cannot take ibuprofen due to being on blood thinners. Has a PICC in place in [facility]. Is on chemotherapy/immunotherapy for hairy cell leukemia. Last therapy was last Friday. Also has a generalized rash since last Wednesday. Oncologist thinks it may be related to the treatment. Pt also c/o a runny nose.

## 2020-09-15 NOTE — ED PROVIDER NOTES
Persistent sinus tachycardia  History     Chief Complaint:  Fever    HPI   Alfonso Kendrick is a 50 year old male on Eliquis with a history of hairy cell leukemia who presents with fever. The patient complains of a fever and rhinorrhea that began on Saturday (09/12/2020). The patient has been treating the symptoms with Tylenol every four hours, with last dose at 1400. He is unable to take ibuprofen due to blood thinner use. He is currently undergoing chemotherapy for hairy cell leukemia, with his last treatment on Friday (09/11/2020). His oncologist possibly attributes an additional symptom of generalized rash to the treatment. This began last Wednesday (09/09/2020). Denies any shortness of breath, chest pain, cough, abdominal pain, nausea, vomiting, or urinary symptoms.     Allergies:  No known drug allergies    Medications:    Compazine   Zyloprim   Acetaminophen 325 mg  Eliquis   Decadron    Past Medical History:    Hairy cell leukemia   Obesity   DVT   Hepatitis A    Past Surgical History:    Biopsy - left shoulder  Repair tendon achilles   IR PICC placement     Family History:    Mother:  Breast cancer     Social History:  Smoking status: no  Alcohol use: yes, occasionally   Drug use: no  The patient presents to the emergency department by personal vehicle     PCP: Chuy Guzman  Marital Status:   [2]    Review of Systems   Constitutional: Positive for fever.   HENT: Positive for rhinorrhea.    Respiratory: Negative for shortness of breath.    Cardiovascular: Negative for chest pain.   Gastrointestinal: Negative for nausea and vomiting.   Genitourinary: Negative.    Skin: Positive for rash.   All other systems reviewed and are negative.    Physical Exam     Patient Vitals for the past 24 hrs:   BP Temp Temp src Pulse Resp SpO2 Height Weight   09/15/20 2015 -- -- -- 123 27 95 % -- --   09/15/20 2000 101/71 100.4  F (38  C) Oral 129 27 96 % -- --   09/15/20 1945 112/64 -- -- 130 19 96 % -- --    09/15/20 1930 -- -- -- 128 (!) 32 97 % -- --   09/15/20 1915 111/71 -- -- 128 29 99 % -- --   09/15/20 1900 123/76 -- -- 133 (!) 32 100 % -- --   09/15/20 1843 -- 103  F (39.4  C) Oral -- -- -- -- --   09/15/20 1830 -- -- -- 126 24 99 % -- --   09/15/20 1815 113/70 -- -- 123 29 99 % -- --   09/15/20 1800 115/72 -- -- 126 27 99 % -- --   09/15/20 1745 109/74 -- -- 129 24 99 % -- --   09/15/20 1730 -- -- -- 126 25 99 % -- --   09/15/20 1715 -- -- -- 126 (!) 33 98 % -- --   09/15/20 1700 113/72 -- -- 125 20 96 % -- --   09/15/20 1645 114/75 -- -- 129 26 95 % -- --   09/15/20 1630 125/74 -- -- 134 26 96 % -- --   09/15/20 1620 120/85 102.9  F (39.4  C) Oral 146 20 97 % 1.829 m (6') 112.5 kg (248 lb)       Physical Exam  General: Alert, interactive.   Head:  Scalp is atraumatic.  Eyes:  EOM intact. The pupils are equal, round, and reactive to light. No scleral icterus.   ENT:                                      Ears:  The external ears are normal.   Nose:  The external nose is normal.  Throat:  The oropharynx is normal. Mucus membranes are moist.                 Neck:  Normal range of motion. There is no rigidity.   CV:  tachycardic rate and regular rhythm. No murmur. 2+ radial pulses  Resp:  Breath sounds are clear bilaterally. Non-labored, no retractions or accessory muscle use.  GI:  Abdomen is soft, no distension, no tenderness.   MS:  Normal range of motion.   Skin:  Warm and dry. Full body petechial rash that convalesces into purpura. No bullae.   Neuro:  Strength and sensation grossly intact.   Psych:  Awake. Alert.  Appropriate interactions.           Emergency Department Course   ECG (16:25:56):  Rate 132 bpm. PA interval 154. QRS duration 72. QT/QTc 250/370. P-R-T axes 63 48 -21. Sinus tachycardia. Nonspecific T wave abnormality. Abnormal ECG.  Interpreted at 1634 by Andrea Osullivan DO.    Imaging:  Radiology findings were communicated with the patient who voiced understanding of the findings.    Chest XR  Port 1 View  IMPRESSION: Right-sided PICC line with tip over atrial caval junction. No pneumothorax or pleural effusion. No focal consolidation. Cardiac silhouette within normal limits. No acute osseous abnormality.  As read by Radiology.    Laboratory:  Laboratory findings were communicated with the patient who voiced understanding of the findings.    UA: glucose: 30, blood: small, protein albumin: 300, ketones: trace, mucous: present o/w Negative    Symptomatic COVID-19 Virus by PCR Nasopharyngeal swab: pending    Blood Culture x2: Pending    Lactic acid whole blood (1714): 1.6    CMP: Sodium: 132 (L), glucose: 145 (H), calcium: 8.3 (L), albumin: 2.6 (L), protein total: 6.4 (L), AST: 76 (H) o/w WNL (Creatinine 1.21)     CBC: WBC 0.2 (L), HGB 9.2 (L), PLT 86 (L)    Partial thromboplastin time: 46 (H)    Fibrinogen activity: 643 (H)    D-dimer: 12.8 (H)    INR: 1.79 (H)    Interventions:  1735 NS 1L IV Bolus  1901 Toradol 15 mg IV   1904 Zosyn 4.5 g IV   NS 1L IV Bolus    Emergency Department Course:  Past medical records, nursing notes, and vitals reviewed.  1655: I performed an exam of the patient and obtained history, as documented above.     EKG was obtained and reviewed in the emergency department.    IV inserted and blood drawn.    The patient was sent for a Chest XR while in the emergency department, results above.     1840: I discussed the patient with Dr. Anderson, of [facility].    1845: I rechecked the patient. I reviewed the results with the Patient and answered all related questions prior to admission.     1855: I discussed the patient with Dr. Osullivan, emergency department physician.     1920: I discussed the patient with Dr. Louis od hospitalist.     Findings and plan explained to the Patient who consents to admission. Discussed the patient with Dr. Louis, who will admit the patient to a medical bed for further monitoring, evaluation, and treatment.  Impression & Plan     Covid-19  Alfonso FRANKLIN Aroldo was  evaluated during a global COVID-19 pandemic, which necessitated consideration that the patient might be at risk for infection with the SARS-CoV-2 virus that causes COVID-19.   Applicable protocols for evaluation were followed during the patient's care.   COVID-19 was considered as part of the patient's evaluation. The plan for testing is:  a test was obtained during this visit.    Medical Decision Making:  Alfonso Kendrick is a 50 year old male with a medical history including hairy cell leukemia and DVT on Eliquis presents emergency department with 4 days of fever and rash.  Patient has no other infectious symptoms besides runny nose.  Unclear source of fever at this time.  Chest x-ray with no evidence of pneumonia.  Urine without signs of infection.  Pancytopenia noted with WBC of 0.2.  Hemoglobin 9.2.  Platelets 86.  Patient had persistent fever and tachycardia.  1 dose of Toradol given and on recheck patient felt improved and temperature trended down.  Persistent tachycardia despite 1 L of normal saline.  Will give an additional liter.  Discussed plan with Dr. Anderson of [facility] who recommended broad-spectrum antibiotics and admission.  Rash consistent with petechial rash and concern for possible vasculitis secondary to his chemotherapy.  Further work-up as an inpatient indicated.    Diagnosis:    ICD-10-CM    1. Neutropenic fever (H)  D70.9 Blood culture    R50.81 Blood culture     Symptomatic COVID-19 Virus (Coronavirus) by PCR     UA with Microscopic     D dimer quantitative     D dimer quantitative     Fibrinogen activity     Fibrinogen activity     INR     INR     Partial thromboplastin time     Partial thromboplastin time     SARS-CoV-2 COVID-19 Virus (Coronavirus) RT-PCR     SARS-CoV-2 COVID-19 Virus (Coronavirus) RT-PCR     CANCELED: INR     CANCELED: Fibrinogen activity     CANCELED: D dimer quantitative     CANCELED: Partial thromboplastin time   2. Hairy cell leukemia (H)  C91.40         Disposition:  Admitted to medical bed.    Marti Arnold  9/15/2020   [facility] EMERGENCY DEPARTMENT  Scribe Disclosure:  I, Marti Barrett, am serving as a scribe at 4:55 PM on 9/15/2020 to document services personally performed by Joann Uribe PA-C based on my observations and the provider's statements to me.        Joann Uribe PA-C  09/15/20 2043

## 2020-09-16 LAB
ANION GAP SERPL CALCULATED.3IONS-SCNC: 7 MMOL/L (ref 3–14)
BUN SERPL-MCNC: 23 MG/DL (ref 7–30)
CALCIUM SERPL-MCNC: 7.4 MG/DL (ref 8.5–10.1)
CHLORIDE SERPL-SCNC: 102 MMOL/L (ref 94–109)
CO2 SERPL-SCNC: 24 MMOL/L (ref 20–32)
CREAT SERPL-MCNC: 1.4 MG/DL (ref 0.66–1.25)
DIFFERENTIAL METHOD BLD: ABNORMAL
ERYTHROCYTE [DISTWIDTH] IN BLOOD BY AUTOMATED COUNT: 12.9 % (ref 10–15)
GFR SERPL CREATININE-BSD FRML MDRD: 58 ML/MIN/{1.73_M2}
GLUCOSE SERPL-MCNC: 122 MG/DL (ref 70–99)
HCT VFR BLD AUTO: 24.9 % (ref 40–53)
HGB BLD-MCNC: 8.1 G/DL (ref 13.3–17.7)
INTERPRETATION ECG - MUSE: NORMAL
LABORATORY COMMENT REPORT: NORMAL
LACTATE BLD-SCNC: 1.5 MMOL/L (ref 0.7–2)
LACTATE BLD-SCNC: 1.6 MMOL/L (ref 0.7–2)
MAGNESIUM SERPL-MCNC: 1.7 MG/DL (ref 1.6–2.3)
MCH RBC QN AUTO: 32.8 PG (ref 26.5–33)
MCHC RBC AUTO-ENTMCNC: 32.5 G/DL (ref 31.5–36.5)
MCV RBC AUTO: 101 FL (ref 78–100)
PLATELET # BLD AUTO: 74 10E9/L (ref 150–450)
POTASSIUM SERPL-SCNC: 3.8 MMOL/L (ref 3.4–5.3)
RBC # BLD AUTO: 2.47 10E12/L (ref 4.4–5.9)
SARS-COV-2 RNA SPEC QL NAA+PROBE: NEGATIVE
SODIUM SERPL-SCNC: 133 MMOL/L (ref 133–144)
SPECIMEN SOURCE: NORMAL
WBC # BLD AUTO: 0.2 10E9/L (ref 4–11)

## 2020-09-16 PROCEDURE — 25000132 ZZH RX MED GY IP 250 OP 250 PS 637: Performed by: INTERNAL MEDICINE

## 2020-09-16 PROCEDURE — 12000000 ZZH R&B MED SURG/OB

## 2020-09-16 PROCEDURE — 80048 BASIC METABOLIC PNL TOTAL CA: CPT | Performed by: INTERNAL MEDICINE

## 2020-09-16 PROCEDURE — 85027 COMPLETE CBC AUTOMATED: CPT

## 2020-09-16 PROCEDURE — 99233 SBSQ HOSP IP/OBS HIGH 50: CPT | Performed by: INTERNAL MEDICINE

## 2020-09-16 PROCEDURE — 83605 ASSAY OF LACTIC ACID: CPT | Performed by: INTERNAL MEDICINE

## 2020-09-16 PROCEDURE — 25000128 H RX IP 250 OP 636: Performed by: INTERNAL MEDICINE

## 2020-09-16 PROCEDURE — 25800030 ZZH RX IP 258 OP 636: Performed by: INTERNAL MEDICINE

## 2020-09-16 PROCEDURE — 83735 ASSAY OF MAGNESIUM: CPT | Performed by: INTERNAL MEDICINE

## 2020-09-16 RX ORDER — ACETAMINOPHEN 650 MG/1
650 SUPPOSITORY RECTAL EVERY 4 HOURS PRN
Status: DISCONTINUED | OUTPATIENT
Start: 2020-09-16 | End: 2020-09-21 | Stop reason: HOSPADM

## 2020-09-16 RX ORDER — ACYCLOVIR 400 MG/1
400 TABLET ORAL EVERY 12 HOURS
Status: DISCONTINUED | OUTPATIENT
Start: 2020-09-16 | End: 2020-09-21 | Stop reason: HOSPADM

## 2020-09-16 RX ORDER — IBUPROFEN 200 MG
200 TABLET ORAL EVERY 6 HOURS PRN
Status: COMPLETED | OUTPATIENT
Start: 2020-09-16 | End: 2020-09-17

## 2020-09-16 RX ORDER — TEMAZEPAM 15 MG/1
15 CAPSULE ORAL
Status: DISCONTINUED | OUTPATIENT
Start: 2020-09-16 | End: 2020-09-21 | Stop reason: HOSPADM

## 2020-09-16 RX ORDER — ACETAMINOPHEN 325 MG/1
650 TABLET ORAL EVERY 4 HOURS PRN
Status: DISCONTINUED | OUTPATIENT
Start: 2020-09-16 | End: 2020-09-21 | Stop reason: HOSPADM

## 2020-09-16 RX ORDER — IBUPROFEN 600 MG/1
600 TABLET, FILM COATED ORAL EVERY 6 HOURS PRN
Status: DISCONTINUED | OUTPATIENT
Start: 2020-09-16 | End: 2020-09-16

## 2020-09-16 RX ADMIN — MELATONIN TAB 3 MG 3 MG: 3 TAB at 02:09

## 2020-09-16 RX ADMIN — TAZOBACTAM SODIUM AND PIPERACILLIN SODIUM 3.38 G: 375; 3 INJECTION, SOLUTION INTRAVENOUS at 20:31

## 2020-09-16 RX ADMIN — TEMAZEPAM 15 MG: 15 CAPSULE ORAL at 22:36

## 2020-09-16 RX ADMIN — POTASSIUM CHLORIDE AND SODIUM CHLORIDE: 900; 150 INJECTION, SOLUTION INTRAVENOUS at 00:30

## 2020-09-16 RX ADMIN — ACYCLOVIR 400 MG: 400 TABLET ORAL at 12:14

## 2020-09-16 RX ADMIN — ACETAMINOPHEN 650 MG: 325 TABLET, FILM COATED ORAL at 10:34

## 2020-09-16 RX ADMIN — IBUPROFEN 200 MG: 200 TABLET, FILM COATED ORAL at 15:23

## 2020-09-16 RX ADMIN — TAZOBACTAM SODIUM AND PIPERACILLIN SODIUM 3.38 G: 375; 3 INJECTION, SOLUTION INTRAVENOUS at 14:11

## 2020-09-16 RX ADMIN — ACYCLOVIR 400 MG: 400 TABLET ORAL at 22:37

## 2020-09-16 RX ADMIN — TAZOBACTAM SODIUM AND PIPERACILLIN SODIUM 3.38 G: 375; 3 INJECTION, SOLUTION INTRAVENOUS at 07:21

## 2020-09-16 RX ADMIN — ACETAMINOPHEN 650 MG: 325 TABLET, FILM COATED ORAL at 14:27

## 2020-09-16 RX ADMIN — ACETAMINOPHEN 650 MG: 325 TABLET, FILM COATED ORAL at 05:14

## 2020-09-16 RX ADMIN — VANCOMYCIN HYDROCHLORIDE 1750 MG: 10 INJECTION, POWDER, LYOPHILIZED, FOR SOLUTION INTRAVENOUS at 08:28

## 2020-09-16 RX ADMIN — KETOTIFEN FUMARATE 1 DROP: 0.35 SOLUTION/ DROPS OPHTHALMIC at 18:05

## 2020-09-16 RX ADMIN — ACETAMINOPHEN 650 MG: 325 TABLET, FILM COATED ORAL at 20:30

## 2020-09-16 RX ADMIN — KETOTIFEN FUMARATE 1 DROP: 0.35 SOLUTION/ DROPS OPHTHALMIC at 22:35

## 2020-09-16 RX ADMIN — VANCOMYCIN HYDROCHLORIDE 1750 MG: 10 INJECTION, POWDER, LYOPHILIZED, FOR SOLUTION INTRAVENOUS at 21:17

## 2020-09-16 RX ADMIN — TAZOBACTAM SODIUM AND PIPERACILLIN SODIUM 3.38 G: 375; 3 INJECTION, SOLUTION INTRAVENOUS at 00:35

## 2020-09-16 RX ADMIN — RIVAROXABAN 10 MG: 10 TABLET, FILM COATED ORAL at 12:14

## 2020-09-16 RX ADMIN — MELATONIN TAB 3 MG 3 MG: 3 TAB at 22:37

## 2020-09-16 RX ADMIN — ACETAMINOPHEN 650 MG: 325 TABLET, FILM COATED ORAL at 00:27

## 2020-09-16 RX ADMIN — POTASSIUM CHLORIDE AND SODIUM CHLORIDE: 900; 150 INJECTION, SOLUTION INTRAVENOUS at 15:24

## 2020-09-16 ASSESSMENT — ACTIVITIES OF DAILY LIVING (ADL)
ADLS_ACUITY_SCORE: 11
ADLS_ACUITY_SCORE: 13
ADLS_ACUITY_SCORE: 11

## 2020-09-16 NOTE — PROGRESS NOTES
Oncology/Hematology Follow Up Note:    Assessment and Plan:  Alfonso Kendrick is a 50 year old male patient admitted 9/15, with neutropenic fevers     1.Hairy cell Leukemia  -Hairy cell leukemia diagnosed in 2010 treated with cladribine when he had pancytopenia and splenomegaly.  - Recurrence noted in August 2020 with neutropenia and thrombocytopenia and resumed treatment with cladribine and weekly rituximab so far has had 3 of 4 weeks of rituximab  -Received cladribine is a 5-day infusion from August 19 through August 24  -Received G-CSF or Neulasta on August 24.    PLAN:   - last dose of Ruxience( biosimilar) on 9/17, will hold for now till improvement  - No futher GCSf indicated as received    2. Neutropenic fever, T max 102.9 F  - has had COVID test done once as Op and negative.  - pending today.  - Agree with broad-spectrum antibiotics and blood cultures and fever work-up is done  -This could also be an immune reaction/ cytokine storm from his hairy cell and treatment with cladribine and Rituxan  -He had a very similar picture to this in 2010 when treated  - Toradol helped with fever    PLAN:  -Continue vanco/ pip/tazo  -If rash worsens stop vanco( though rash precedes this.)  - need to have a lower fever curve for 24 hours prior to home, anticipate his neutropenia will be prolonged.  - Continue Acyclovir.  - With rash, hold bactrim    3. Rash  - Presumed from drug vs immune reaction post caldribine.  - Watch for now,clinically not in distress.    4. FULL CODE    5. DVT prophylaxis  - On rivoroxban 10mg PO daily for PICC line , previous DVT with PICC    PLAN: continue with PLT> 50 k    D/w RN, updated Dr. Louis    Time: 35 minutes with patient , 30 minutes in counseling and coordination of care    6: 30 pm ADDENDUM: I called and spoke to patient's RN and  he continues to have fever at 103 today.  Agree with care of Dr. Louis to reduce his ibuprofen dosing due to bump in creatinine.  This could be an  immunological reaction from cladribine along with dying hairy cells.  If fevers persist despite Tylenol and ibuprofen can also consider adding steroids decided to hold off on that tonight and observe to see how his fever looks over the next 24 hours and obtain culture reults.  No plans for discharge tomorrow.  Patient updated.      Subjective:     Feeling so much better since fever broke, rash persists, no pain, good appetite, in good spirits.    Scheduled Medications:  Reviewed active medications    Labs:  CBC RESULTS:   Recent Labs   Lab Test 09/16/20  0530 09/15/20  1714 08/17/20  0815   WBC 0.2* 0.2* 4.7   HGB 8.1* 9.2* 13.2*   HCT 24.9* 27.0* 40.2   * 97 100   PLT 74* 86* 56*       CMP  Recent Labs   Lab 09/16/20 0530 09/15/20  1714    132*   POTASSIUM 3.8 4.1   CHLORIDE 102 102   CO2 24 25   ANIONGAP 7 5   * 145*   BUN 23 24   CR 1.40* 1.21   GFRESTIMATED 58* 70   GFRESTBLACK 68 81   TAMICA 7.4* 8.3*   MAG 1.7  --    PROTTOTAL  --  6.4*   ALBUMIN  --  2.6*   BILITOTAL  --  0.7   ALKPHOS  --  64   AST  --  76*   ALT  --  40       INR  Recent Labs   Lab 09/15/20  1714   INR 1.79*       Objective/Physical Exam:  Blood pressure 105/56, pulse 118, temperature 101.7  F (38.7  C), temperature source Axillary, resp. rate 18, height 1.829 m (6'), weight 113.9 kg (251 lb 3.2 oz), SpO2 98 %.  General appearance:alert, oriented, no acute distress  HEENT:sclera anicteric, no pallor, no thrush or mucositis.  Lungs: chest is clear to auscultation, no rales or rhonchi appreciated.  Abdomen: soft, NT, ND , BS normal  Ext:  Significant rash noted over lower extremity and back     Terry Maldonado MD  [facility]  9/16/2020 12:13 PM

## 2020-09-16 NOTE — H&P
[facility]    History and Physical  Hospitalist    Name: Alfonso Kendrick    MRN: [id number]  YOB: 1970    Age: 50 year old  Primary care provider: Chuy Guzman       Date of Admission:  9/15/2020  Date of Service (when I saw the patient): 09/15/20    Assessment & Plan   Alfonso Kendrick is a 50 year old male with a past medical history significant for hairy cell leukemia (HCL) who presents with fevers.     Patient was diagnosed with HCL in 2010.  He was treated with cladribine at the time and has been in remission.  More recently, he was unfortunately found to have recurrence of his leukemia confirmed on flow cytometry.  He is undergoing treatment with Rituxan x 4 along with cladribine.  He received  the cladribine about 3 weeks ago and currently getting weekly infusion of rituximab, with the last dose due this Friday.    He had onset of fevers last Saturday, about 4 days ago.  Has been having high-grade fever and has been taking Tylenol round-the-clock.  He also has had onset of generalized rash which he states he had last time he received cladribine as well and attributes it to that.  He has been having  rhinorrhea, occasional cough.  Denies any chest pain, shortness of breath, nausea, vomiting, abdominal pain, urinary symptoms.  He was hoping to avoid admission to the hospital but he was found to be neutropenic today with high-grade fevers and now in agreement with admission to the hospital.  He reports that he had a similar admission 10 years ago and no infectious source was found at the time.    #Febrile neutropenia  #SIRS response secondary to the above  Patient had a high-grade fever up to 103 on admission, white cells of 0.2, febrile and tachycardic with meeting SIRS criteria.  Cladribine can induce fevers mediated through a cytokine storm and certainly this could be noninfectious in etiology.  Nevertheless given his neutropenia, he needs to be covered for infectious sources  at this time as well until infection is ruled out and his fevers improved.  Does have a PICC line in place.  COVID-19 also remains in differential  -Continue IV vancomycin and Zosyn  -Follow blood cultures  -COVID-19 testing pending   -Chest x-ray, UA negative  -We will request oncology consultation, question role for filgrastim  -IV hydration    #Pancytopenia.  Secondary to recent chemotherapy.  Monitor cell counts.     #Previous history of DVT associated with PICC line.  Patient is currently on Xarelto prophylactically.  He denies having any DVT recently.  His platelets are 86 today.  Already took his dose for today.  Could probably resume this tomorrow if remained stable    #Generalized rash.  Patient reports that this is attributed to the cladribine.  Request oncology consult.    #Mild hyponatremia. IVF     COVID-19 Status. Testing pending     Patient's baseline home medications will need to be resumed once the prior to admission medication list has been verified by pharmacy     DVT Prophylaxis: Xarelto  Code Status: Full Code    Disposition: Expected discharge in 3-4 days once ANC improved and fevers resolved.    Plan of care was discussed with the patient in great detail. All of the questions were answered extensively. Patient is comfortable with the plan and agrees with it.     Covid-19  Alfonso Kendrick was evaluated during a global COVID-19 pandemic, and applicable protocols for evaluation were followed during the patient's care.      Edna Louis    Chief Complaint   fevers    History is obtained from the patient     Case discussed with ER provider Joann Uribe    History of Present Illness   Alfonso Kendrick is a 50 year old male who presents with fevers, details of HPI as above    Past Medical History    I have reviewed this patient's medical history and updated it with pertinent information if needed.   Past Medical History:   Diagnosis Date     DVT (deep venous thrombosis) (H)     PICC line  related     Hairy cell leukemia (H) 11/30/2010    Chemotherapy - cladribine 10/25/10     Hepatitis A     1981       Past Surgical History   I have reviewed this patient's surgical history and updated it with pertinent information if needed.  Past Surgical History:   Procedure Laterality Date     BIOPSY      Left shoulder tumor biopsy     IR PICC PLACEMENT > 5 YRS OF AGE  8/17/2020     REPAIR TENDON ACHILLES Left 6/6/2016    Procedure: REPAIR TENDON ACHILLES;  Surgeon: Isaiah Guzman DPM;  Location:  OR       Prior to Admission Medications   Prior to Admission Medications   Prescriptions Last Dose Informant Patient Reported? Taking?   Acetaminophen 325 MG CAPS   Yes No   Sig: Take 325-650 mg by mouth every 4 hours as needed   MULTIVITAMIN TABS   OR   Yes No   allopurinol (ZYLOPRIM) 300 MG tablet   Yes No   Sig: Take 300 mg by mouth daily   order for DME   No No   Sig: Equipment being ordered: walking boot   order for DME   No No   Sig: Equipment being ordered: crutches   order for DME   No No   Sig: Equipment being ordered: RollAbout x 3 months   order for DME   No No   Sig: Equipment being ordered: shower seat   order for DME   No No   Sig: Equipment being ordered: triloc ankle brace   prochlorperazine (COMPAZINE) 10 MG tablet   Yes No   Sig: Take 10 mg by mouth every 6 hours as needed for nausea or vomiting   sulfamethoxazole-trimethoprim (BACTRIM DS) 800-160 MG tablet   Yes No   Sig: Take 1 tablet by mouth 2 times daily      Facility-Administered Medications: None     Allergies   No Known Allergies    Social History   I have reviewed this patient's social history and updated it with pertinent information if needed.   Social History     Socioeconomic History     Marital status:      Spouse name: Not on file     Number of children: Not on file     Years of education: Not on file     Highest education level: Not on file   Occupational History     Occupation: sales     Employer: [employer]      Comment: [employer]    Social Needs     Financial resource strain: Not on file     Food insecurity     Worry: Not on file     Inability: Not on file     Transportation needs     Medical: Not on file     Non-medical: Not on file   Tobacco Use     Smoking status: Never Smoker     Smokeless tobacco: Never Used   Substance and Sexual Activity     Alcohol use: Yes     Comment: Occas     Drug use: No     Sexual activity: Yes     Partners: Female   Lifestyle     Physical activity     Days per week: Not on file     Minutes per session: Not on file     Stress: Not on file   Relationships     Social connections     Talks on phone: Not on file     Gets together: Not on file     Attends Mosque service: Not on file     Active member of club or organization: Not on file     Attends meetings of clubs or organizations: Not on file     Relationship status: Not on file     Intimate partner violence     Fear of current or ex partner: Not on file     Emotionally abused: Not on file     Physically abused: Not on file     Forced sexual activity: Not on file   Other Topics Concern     Parent/sibling w/ CABG, MI or angioplasty before 65F 55M? No   Social History Narrative     Not on file         Family History   I have reviewed this patient's family history and updated it with pertinent information if needed.   Family History   Problem Relation Age of Onset     Breast Cancer Mother      Diabetes Paternal Grandfather      Prostate Cancer Maternal Uncle 65     Prostate Cancer Other         cousin in 40s     Prostate Cancer Maternal Uncle 70       Review of Systems   The 10 point Review of Systems is negative other than noted in the HPI or here.     Physical Exam   Temp: 100.4  F (38  C) Temp src: Oral BP: 112/64 Pulse: 129   Resp: 27 SpO2: 96 % O2 Device: None (Room air)    Vital Signs with Ranges  Temp:  [100.4  F (38  C)-103  F (39.4  C)] 100.4  F (38  C)  Pulse:  [123-146] 129  Resp:  [19-33] 27  BP: (109-125)/(64-85) 112/64  SpO2:  [95 %-100 %] 96 %  248  lbs 0 oz    GENERAL:  Awake and alert, Comfortable appearing, No acute distress.  PSYCH: Pleasant, Cooperative, appropriate affect, no hallucinations   EYES: EOMI, conjunctiva clear  HEENT:  Head is normocephalic, atraumatic, Neck is Supple, trachea is midline   CARDIOVASCULAR: Regular rate and rhythm, Normal S1, S2, no loud murmurs, no rubs or gallops.  Tachycardic  PULMONARY:  Clear to auscultation bilaterally with good entry on both sides  CHEST: Good inspiratory effort bilaterally   GI: Abdomen is soft, non tender, non-distended, normal bowel sounds. No rebound or guarding   SKIN:  Dry, warm to touch.  Generalized macular rash, appears to have some petechial features on the lower extremities  EXTREMITIES:  Good capillary refill with signs of adequate peripheral perfusion. No peripheral edema   Neuro: Awake and oriented x 3. No focal weakness or numbness is noted. Moving all extremities with good strength, Grossly non-focal.   MSK: Good range of motion in all major joints of upper and lower extremity, No acute joint synovitis of the major joints is noted.     Data   Data reviewed today:  I personally reviewed.    All lab data and imaging results from today have been reviewed.     Recent Labs   Lab 09/15/20  1714   WBC 0.2*   HGB 9.2*   MCV 97   PLT 86*   INR 1.79*   *   POTASSIUM 4.1   CHLORIDE 102   CO2 25   BUN 24   CR 1.21   ANIONGAP 5   TAMICA 8.3*   *   ALBUMIN 2.6*   PROTTOTAL 6.4*   BILITOTAL 0.7   ALKPHOS 64   ALT 40   AST 76*       Recent Results (from the past 24 hour(s))   XR Chest Port 1 View    Narrative    EXAM: XR CHEST PORT 1 VW  LOCATION: [facility]  DATE/TIME: 9/15/2020 5:11 PM    INDICATION: Fever, unknown source  COMPARISON: 11/14/2010      Impression    IMPRESSION: Right-sided PICC line with tip over atrial caval junction. No pneumothorax or pleural effusion. No focal consolidation. Cardiac silhouette within normal limits. No acute osseous abnormality.

## 2020-09-16 NOTE — PROVIDER NOTIFICATION
Call placed to MD to report temperature of 102.5 at 0000.  No additional orders given by MD.  Patient given tylenol.  Recheck temp 102 at 0145 and sepsis protocol triggered.  Lactic acid sent and will update MD with result when result obtained.  Will continue to monitor and intervene as needed.

## 2020-09-16 NOTE — ED PROVIDER NOTES
Emergency Department Attending Supervision Note  9/15/2020  7:00 PM      I evaluated this patient in conjunction with Joann Uribe PA-C    Briefly, the patient is anticoagulated on Eliquis with a history of hairy cell leukemia who presents with a fever. Patient reports fever and rhinorrhea for the last 3 days and has been taking Tylenol for his symptoms. He also reports a generalized rash for the past week, which his oncologist attributes to his chemotherapy treatment. Last chemotherapy 9/11/2020. Denies shortness of breath, chest pain, cough, abdominal pain, nausea, vomiting, or urinary symptoms.     Exam:  Constitutional: Alert.  HENT:    Nose: Nose normal.    Mouth/Throat: Oropharynx is clear, mucous membranes are moist  Eyes: EOM are normal. Pupils are equal, round, and reactive to light.   CV: Regular rate and rhythm, no murmurs, rubs or gallops.  Resp: Clear lungs to auscultation, all lung fields. Normal respiratory effort.   GI: Soft, non-distended. There is no tenderness. No rebound or guarding.   MSK: Normal range of motion. No deformity.   Neurological:   A/Ox3  5/5 strength is symmetric to the upper and lower extremities;   Sensation intact to light touch throughout the upper and lower extremities;   Skin: Diffuse petechial rash that convalesces into purpura. Non-blanching. Negative nikolsky sign. No bullae seen.     Results:  IMAGING:  Chest XR Port 1 View  IMPRESSION: Right-sided PICC line with tip over atrial caval junction. No pneumothorax or pleural effusion. No focal consolidation. Cardiac silhouette within normal limits. No acute osseous abnormality.  Per radiology.    LABORATORY:  UA: glucose: 30, blood: small, protein albumin: 300, ketones: trace, mucous: present o/w Negative     Symptomatic COVID-19 Virus by PCR Nasopharyngeal swab: pending     Blood Culture x2: Pending     Lactic acid whole blood (1714): 1.6     CMP: Sodium: 132 (L), glucose: 145 (H), calcium: 8.3 (L), albumin: 2.6 (L), protein  total: 6.4 (L), AST: 76 (H) o/w WNL (Creatinine 1.21)     CBC: WBC 0.2 (L), HGB 9.2 (L), PLT 86 (L)    ED course:  Service shared with me at 1855    Please see Joann Uribe's note for full ED course.    Patient admitted to Dr. Rea.    My impression is:  This is a 50-year-old male who comes in with neutropenic fever and petechial rash.  Oncology was consulted by the PA, Joann Uribe, who recommended broad-spectrum antibiotics and admission to the hospital.  I agree with this plan.  He does not appear in acute distress or in extremis at this time.  There is no obvious focus of infection at this time.  This may be a vasculitis secondary to his chemotherapy but this is unclear at this time and needs to be further evaluated on admission      Diagnosis:    ICD-10-CM    1. Neutropenic fever (H)  D70.9 UA with Microscopic    R50.81    2. Hairy cell leukemia (H)  C91.40        Scribe Disclosure:  I, Marti Acosta, am serving as a scribe at 7:07 PM on 9/15/2020 to document services personally performed by Andrea Osullivan DO based on my observations and the provider's statements to me.       Marti Acosta  9/15/2020           Andrea Osullivan DO  09/15/20 1930     Posterior Auricular Interpolation Flap Text: A decision was made to reconstruct the defect utilizing an interpolation axial flap and a staged reconstruction.  A telfa template was made of the defect.  This telfa template was then used to outline the posterior auricular interpolation flap.  The donor area for the pedicle flap was then injected with anesthesia.  The flap was excised through the skin and subcutaneous tissue down to the layer of the underlying musculature.  The pedicle flap was carefully excised within this deep plane to maintain its blood supply.  The edges of the donor site were undermined.   The donor site was closed in a primary fashion.  The pedicle was then rotated into position and sutured.  Once the tube was sutured into place, adequate blood supply was confirmed with blanching and refill.  The pedicle was then wrapped with xeroform gauze and dressed appropriately with a telfa and gauze bandage to ensure continued blood supply and protect the attached pedicle.

## 2020-09-16 NOTE — PLAN OF CARE
End of Shift Summary  For vital signs and complete assessments, please see documentation flowsheets.     Pertinent assessments: Patient with fevers all shift, MD notifed, Tmax 102.5.  Tylenol given x2.  Red rash over most of body, denies pain or discomfort with rash, related to cladribine per patient.    Major Shift Events Fevers  Treatment Plan: Monitor temps, administer antibiotics, await blood cultures and covid results.  Bedside Nurse: Saray Yusuf RN

## 2020-09-16 NOTE — PROGRESS NOTES
[facility]    Medicine Progress Note - Hospitalist Service       Date of Admission:  9/15/2020  Date of Service: 09/16/2020    Assessment & Plan     Alfonso Kendrick is a 50 year old male with a past medical history significant for hairy cell leukemia (HCL) who presents with fevers. Patient was diagnosed with HCL in 2010.  He was treated with cladribine at the time and has been in remission.  More recently, he was unfortunately found to have recurrence of his leukemia confirmed on flow cytometry.  He is undergoing treatment with Rituxan x 4 along with cladribine.  He received  the cladribine about 3 weeks ago and currently getting weekly infusion of rituximab, with the last dose due this Friday.    He presented with 4 days onset of fevers, runny nose, occasional cough and also a recent rash. He was found to be neutropenic with high-grade fevers and admitted to the hospital. He reports that he had a similar admission 10 years ago and no infectious source was found at the time.     #Febrile neutropenia  #SIRS response secondary to the above  Patient had a high-grade fever up to 103 on admission, white cells of 0.2, febrile and tachycardic with meeting SIRS criteria.  Cladribine can induce fevers mediated through a cytokine storm and certainly this could be noninfectious in etiology.  Nevertheless given his neutropenia, he needs to be covered for infectious sources at this time as well until infection is ruled out and his fevers improved.  Does have a PICC line in place.  COVID-19 testing negative. Chest x-ray, UA negative    -Continue IV vancomycin and Piperacillin/Tazobactam. Stop vancomycin tomorrow if no MRSA isolated given the bump in his Cr  -Follow blood cultures  --Discussed with oncology, he will stay neutropenic for a while, so the goal would be improvement in fevers prior to discharge     #Pancytopenia.  Secondary to recent chemotherapy.  Monitor cell counts.      #Previous history of DVT  associated with PICC line.  Patient is currently on Xarelto prophylactically.  He denies having any DVT recently.  Resumed     #Generalized rash.  Patient reports that this is attributed to the cladribine.  Monitor     #Mild hyponatremia. Resolved    #GLADYS.  Mild.  Patient with increasing creatinine to 1.4 today.  He did receive a dose of ketorolac yesterday.  Discussed the need to minimize NSAID use, would leave a low-dose of ibuprofen available only if his fevers are refractory to Tylenol.  He is in agreement.  Also on vancomycin, monitored through pharmacy.  Probably stop that tomorrow.  Continue IV fluids in the meanwhile       Diet: Combination Diet Regular Diet Adult    DVT Prophylaxis: Xarelto  Moralez Catheter: not present  Code Status: Full Code      Disposition Plan   Expected discharge: 2 - 3 days, recommended to prior living arrangement once fevers staying below 100.  Entered: Edna Louis MD 09/16/2020, 2:03 PM       The patient's care was discussed with the Bedside Nurse, Patient and oncology Consultant.    Edna Louis MD  Hospitalist Service  [facility]    ______________________________________________________________________    Interval History   Chart reviewed and patient seen. Case discussed with nursing staff.     Patient feels better, fever broke, taking PO, Denies any chest pain, shortness of breath. No reports of abdominal pain or vomiting. Feels some itching in eyes, No new complaints voiced otherwise.       Data reviewed today: I reviewed all medications, new labs and imaging results over the last 24 hours. I personally reviewed    Physical Exam   Vital Signs: Temp: 101.7  F (38.7  C) Temp src: Axillary BP: 105/56 Pulse: 118   Resp: 18 SpO2: 97 % O2 Device: None (Room air)    Weight: 251 lbs 3.2 oz    GENERAL:  Awake and alert, Comfortable appearing, No acute distress.  PSYCH: Pleasant, Cooperative, appropriate affect, no hallucinations   EYES: EOMI, conjunctiva  clear  HEENT:  Head is normocephalic, atraumatic, Neck is Supple, trachea is midline   CARDIOVASCULAR: Regular rate and rhythm, Normal S1, S2, no loud murmurs, no rubs or gallops.  Tachycardic  PULMONARY:  Clear to auscultation bilaterally with good entry on both sides  CHEST: Good inspiratory effort bilaterally   GI: Abdomen is soft, non tender, non-distended, normal bowel sounds. No rebound or guarding   SKIN:  Dry, warm to touch.  Generalized macular rash, appears to have some petechial features on the lower extremities  EXTREMITIES:  Good capillary refill with signs of adequate peripheral perfusion. No peripheral edema   Neuro: Awake and oriented x 3. No focal weakness or numbness is noted. Moving all extremities with good strength, Grossly non-focal.   MSK: Good range of motion in all major joints of upper and lower extremity, No acute joint synovitis of the major joints is noted.        Data   Recent Labs   Lab 09/16/20  0530 09/15/20  1714   WBC 0.2* 0.2*   HGB 8.1* 9.2*   * 97   PLT 74* 86*   INR  --  1.79*    132*   POTASSIUM 3.8 4.1   CHLORIDE 102 102   CO2 24 25   BUN 23 24   CR 1.40* 1.21   ANIONGAP 7 5   TAMICA 7.4* 8.3*   * 145*   ALBUMIN  --  2.6*   PROTTOTAL  --  6.4*   BILITOTAL  --  0.7   ALKPHOS  --  64   ALT  --  40   AST  --  76*       Recent Results (from the past 24 hour(s))   XR Chest Port 1 View    Narrative    EXAM: XR CHEST PORT 1 VW  LOCATION: [facility]  DATE/TIME: 9/15/2020 5:11 PM    INDICATION: Fever, unknown source  COMPARISON: 11/14/2010      Impression    IMPRESSION: Right-sided PICC line with tip over atrial caval junction. No pneumothorax or pleural effusion. No focal consolidation. Cardiac silhouette within normal limits. No acute osseous abnormality.     Medications     0.9% sodium chloride + KCl 20 mEq/L 100 mL/hr at 09/16/20 0030     - MEDICATION INSTRUCTIONS -         sodium chloride 0.9%  1,000 mL Intravenous Once     acyclovir  400 mg Oral  Q12H     piperacillin-tazobactam  3.375 g Intravenous Q6H     rivaroxaban ANTICOAGULANT  10 mg Oral Daily     sodium chloride (PF)  10 mL Intracatheter Q7 Days     sodium chloride (PF)  3 mL Intracatheter Q8H     vancomycin (VANCOCIN) IV  1,750 mg Intravenous Q12H

## 2020-09-16 NOTE — PHARMACY-VANCOMYCIN DOSING SERVICE
Pharmacy Vancomycin Initial Note  Date of Service September 15, 2020  Patient's  1970  50 year old, male    Indication: Febrile Neutropenia    Current estimated CrCl = Estimated Creatinine Clearance: 96.2 mL/min (based on SCr of 1.21 mg/dL).    Creatinine for last 3 days  9/15/2020:  5:14 PM Creatinine 1.21 mg/dL    Recent Vancomycin Level(s) for last 3 days  No results found for requested labs within last 72 hours.      Vancomycin IV Administrations (past 72 hours)                   vancomycin (VANCOCIN) 2,500 mg in sodium chloride 0.9 % 500 mL intermittent infusion (mg) 2,500 mg New Bag 09/15/20 2003                Nephrotoxins and other renal medications (From now, onward)    Start     Dose/Rate Route Frequency Ordered Stop    20 0800  vancomycin (VANCOCIN) 1,750 mg in sodium chloride 0.9 % 500 mL intermittent infusion      1,750 mg  over 2 Hours Intravenous EVERY 12 HOURS 09/15/20 2251      20 0100  piperacillin-tazobactam (ZOSYN) infusion 3.375 g      3.375 g  over 30 Minutes Intravenous EVERY 6 HOURS 09/15/20 2214            Contrast Orders - past 72 hours (72h ago, onward)    None                Plan:  1.  Start vancomycin  1750 mg IV q12h.   2.  Goal Trough Level: 10-15 mg/L   3.  Pharmacy will check trough levels as appropriate in 1-3 Days.    4. Serum creatinine levels will be ordered a minimum of twice weekly.    5. [place] method utilized to dose vancomycin therapy: Method 1    Kayleen Claudio [facility]

## 2020-09-16 NOTE — PHARMACY-ADMISSION MEDICATION HISTORY
Admission medication history interview status for this patient is complete. See [facility] admission navigator for allergy information, prior to admission medications and immunization status.     Medication history interview done via telephone during Covid-19 pandemic, indicate source(s): Patient  Medication history resources (including written lists, pill bottles, clinic record):None  Pharmacy: [facility] in [employer] in [place]    Changes made to PTA medication list:  Added: Vitamin D, Acyclovir, Rivaroxaban, Temazepam, Levofloxacin, Methylprednisolone, Culturelle  Deleted: Allopurinol   Changed: Bactrim DS BID --> Daily    Actions taken by pharmacist (provider contacted, etc):None     Additional medication history information:  -Of note, patient takes his rivaroxaban in the morning.     -Patient was prescribed a Medrol DosePak on 9/11, reports he was supposed to take 2 tablets for his dose today, but had not taken it yet.     -Patient was prescribed a 7 day course of levofloxacin on 9/13.     Medication reconciliation/reorder completed by provider prior to medication history?  No   (Y/N)     For patients on insulin therapy: No  (Y/N)      Prior to Admission medications    Medication Sig Last Dose Taking? Auth Provider   Acetaminophen 325 MG CAPS Take 325-650 mg by mouth every 4 hours as needed Past Month at Unknown time Yes Reported, Patient   acyclovir (ZOVIRAX) 400 MG tablet Take 400 mg by mouth every 12 hours 9/15/2020 at x1 Yes Unknown, Entered By History   lactobacillus rhamnosus, GG, (CULTURELL) capsule Take 1 capsule by mouth daily 9/15/2020 at Unknown time Yes Unknown, Entered By History   levofloxacin (LEVAQUIN) 500 MG tablet Take 500 mg by mouth daily For 7 days. 9/15/2020 at Unknown time Yes Unknown, Entered By History   methylPREDNISolone (MEDROL DOSEPAK) 4 MG tablet therapy pack Take by mouth See Admin Instructions Follow Package Directions 9/14/2020 at Unknown time Yes Unknown, Entered By History   MULTIVITAMIN  TABS   OR Take 1 tablet by mouth daily  9/15/2020 at Unknown time Yes Jay Archuleta MD   prochlorperazine (COMPAZINE) 10 MG tablet Take 10 mg by mouth every 6 hours as needed for nausea or vomiting Past Month at Unknown time Yes Reported, Patient   rivaroxaban ANTICOAGULANT (XARELTO) 10 MG TABS tablet Take 10 mg by mouth daily 9/15/2020 at Unknown time Yes Unknown, Entered By History   sulfamethoxazole-trimethoprim (BACTRIM DS) 800-160 MG tablet Take 1 tablet by mouth daily  9/15/2020 at Unknown time Yes Reported, Patient   temazepam (RESTORIL) 15 MG capsule Take 15 mg by mouth nightly as needed for sleep Past Month at Unknown time Yes Unknown, Entered By History   Vitamin D, Cholecalciferol, 25 MCG (1000 UT) TABS Take 1 tablet by mouth daily 9/15/2020 at Unknown time Yes Unknown, Entered By History

## 2020-09-17 LAB
ANION GAP SERPL CALCULATED.3IONS-SCNC: 6 MMOL/L (ref 3–14)
BUN SERPL-MCNC: 18 MG/DL (ref 7–30)
CALCIUM SERPL-MCNC: 7.6 MG/DL (ref 8.5–10.1)
CHLORIDE SERPL-SCNC: 108 MMOL/L (ref 94–109)
CO2 SERPL-SCNC: 23 MMOL/L (ref 20–32)
CREAT SERPL-MCNC: 1.55 MG/DL (ref 0.66–1.25)
DIFFERENTIAL METHOD BLD: ABNORMAL
ERYTHROCYTE [DISTWIDTH] IN BLOOD BY AUTOMATED COUNT: 13.1 % (ref 10–15)
GFR SERPL CREATININE-BSD FRML MDRD: 52 ML/MIN/{1.73_M2}
GLUCOSE SERPL-MCNC: 119 MG/DL (ref 70–99)
HCT VFR BLD AUTO: 21.4 % (ref 40–53)
HGB BLD-MCNC: 7 G/DL (ref 13.3–17.7)
LACTATE BLD-SCNC: 1.4 MMOL/L (ref 0.7–2)
MCH RBC QN AUTO: 33 PG (ref 26.5–33)
MCHC RBC AUTO-ENTMCNC: 32.7 G/DL (ref 31.5–36.5)
MCV RBC AUTO: 101 FL (ref 78–100)
PLATELET # BLD AUTO: 53 10E9/L (ref 150–450)
POTASSIUM SERPL-SCNC: 3.8 MMOL/L (ref 3.4–5.3)
RBC # BLD AUTO: 2.12 10E12/L (ref 4.4–5.9)
SODIUM SERPL-SCNC: 137 MMOL/L (ref 133–144)
VANCOMYCIN SERPL-MCNC: 15.4 MG/L
WBC # BLD AUTO: 0.3 10E9/L (ref 4–11)

## 2020-09-17 PROCEDURE — 12000000 ZZH R&B MED SURG/OB

## 2020-09-17 PROCEDURE — 83605 ASSAY OF LACTIC ACID: CPT | Performed by: INTERNAL MEDICINE

## 2020-09-17 PROCEDURE — 25000128 H RX IP 250 OP 636: Performed by: INTERNAL MEDICINE

## 2020-09-17 PROCEDURE — 80048 BASIC METABOLIC PNL TOTAL CA: CPT | Performed by: INTERNAL MEDICINE

## 2020-09-17 PROCEDURE — 85025 COMPLETE CBC W/AUTO DIFF WBC: CPT | Performed by: INTERNAL MEDICINE

## 2020-09-17 PROCEDURE — 85027 COMPLETE CBC AUTOMATED: CPT

## 2020-09-17 PROCEDURE — 25000132 ZZH RX MED GY IP 250 OP 250 PS 637: Performed by: INTERNAL MEDICINE

## 2020-09-17 PROCEDURE — 99233 SBSQ HOSP IP/OBS HIGH 50: CPT | Performed by: INTERNAL MEDICINE

## 2020-09-17 PROCEDURE — 80202 ASSAY OF VANCOMYCIN: CPT | Performed by: INTERNAL MEDICINE

## 2020-09-17 RX ORDER — IBUPROFEN 200 MG
200 TABLET ORAL EVERY 6 HOURS PRN
Status: COMPLETED | OUTPATIENT
Start: 2020-09-17 | End: 2020-09-17

## 2020-09-17 RX ADMIN — POTASSIUM CHLORIDE AND SODIUM CHLORIDE: 900; 150 INJECTION, SOLUTION INTRAVENOUS at 16:43

## 2020-09-17 RX ADMIN — MELATONIN TAB 3 MG 3 MG: 3 TAB at 23:46

## 2020-09-17 RX ADMIN — RIVAROXABAN 10 MG: 10 TABLET, FILM COATED ORAL at 08:16

## 2020-09-17 RX ADMIN — OMEPRAZOLE 20 MG: 20 CAPSULE, DELAYED RELEASE ORAL at 14:34

## 2020-09-17 RX ADMIN — IBUPROFEN 200 MG: 200 TABLET, FILM COATED ORAL at 05:14

## 2020-09-17 RX ADMIN — ACETAMINOPHEN 650 MG: 325 TABLET, FILM COATED ORAL at 14:16

## 2020-09-17 RX ADMIN — TAZOBACTAM SODIUM AND PIPERACILLIN SODIUM 3.38 G: 375; 3 INJECTION, SOLUTION INTRAVENOUS at 14:16

## 2020-09-17 RX ADMIN — TAZOBACTAM SODIUM AND PIPERACILLIN SODIUM 3.38 G: 375; 3 INJECTION, SOLUTION INTRAVENOUS at 20:01

## 2020-09-17 RX ADMIN — KETOTIFEN FUMARATE 1 DROP: 0.35 SOLUTION/ DROPS OPHTHALMIC at 22:22

## 2020-09-17 RX ADMIN — TAZOBACTAM SODIUM AND PIPERACILLIN SODIUM 3.38 G: 375; 3 INJECTION, SOLUTION INTRAVENOUS at 08:16

## 2020-09-17 RX ADMIN — TEMAZEPAM 15 MG: 15 CAPSULE ORAL at 23:46

## 2020-09-17 RX ADMIN — TAZOBACTAM SODIUM AND PIPERACILLIN SODIUM 3.38 G: 375; 3 INJECTION, SOLUTION INTRAVENOUS at 01:43

## 2020-09-17 RX ADMIN — ACETAMINOPHEN 650 MG: 325 TABLET, FILM COATED ORAL at 21:03

## 2020-09-17 RX ADMIN — ACETAMINOPHEN 650 MG: 325 TABLET, FILM COATED ORAL at 06:41

## 2020-09-17 RX ADMIN — POTASSIUM CHLORIDE AND SODIUM CHLORIDE: 900; 150 INJECTION, SOLUTION INTRAVENOUS at 05:07

## 2020-09-17 RX ADMIN — ACYCLOVIR 400 MG: 400 TABLET ORAL at 22:22

## 2020-09-17 RX ADMIN — ACETAMINOPHEN 650 MG: 325 TABLET, FILM COATED ORAL at 01:43

## 2020-09-17 RX ADMIN — ACYCLOVIR 400 MG: 400 TABLET ORAL at 11:45

## 2020-09-17 RX ADMIN — IBUPROFEN 200 MG: 200 TABLET, FILM COATED ORAL at 21:03

## 2020-09-17 RX ADMIN — IBUPROFEN 200 MG: 200 TABLET, FILM COATED ORAL at 14:34

## 2020-09-17 ASSESSMENT — ACTIVITIES OF DAILY LIVING (ADL)
ADLS_ACUITY_SCORE: 11

## 2020-09-17 NOTE — PLAN OF CARE
End of Shift Summary  For vital signs and complete assessments, please see documentation flowsheets.     Pertinent assessments: Febrile most of shift. Tachycardic with fevers. No cough or shortness of breath. Denies nausea. C/o dry/itchy eyes, eye drops ordered by MD. Up independently. Remains on protective isolation.   Major Shift Events: Temp max of 103.1.   Treatment Plan: PRN tylenol, ibuprofen. IV vanco and zosyn. Supportive cares.

## 2020-09-17 NOTE — PROGRESS NOTES
End of Shift Summary  For vital signs and complete assessments, please see documentation flowsheets.     Pertinent assessments: A&O x 4, tachy, on RA, temp 101.0 Tylenol x 1 given, ice packs given, recheck 103.0 Ibuprofen given. Denies any pain or nausea. Rash throughout body no itching or pain reported.     Major Shift Events: T-max: 103.0  Treatment Plan: Await blood culture results, Vanco, Zosyn, Acyclovir, Xarelto, IVF, monitor temps    Bedside Nurse: Ruchi Tubbs RN

## 2020-09-17 NOTE — PLAN OF CARE
End of Shift Summary  For vital signs and complete assessments, please see documentation flowsheets.     Pertinent assessments: A&O x 4, tachy, on RA, T-Max 98.7 Denies any pain or nausea. Rash throughout body no itching or pain reported. Hem/Onc, Hospitalist, following. Possible derm consult out pt vs inpt.     Major Shift Events: T-max: 98.6.0  Treatment Plan: Await blood culture final results, Vanco - dc'd, Zosyn, Acyclovir, Xarelto, IVF, monitor temps

## 2020-09-17 NOTE — PROGRESS NOTES
[facility]    Medicine Progress Note - Hospitalist Service       Date of Admission:  9/15/2020  Date of Service: 09/17/2020    Assessment & Plan     Alfonso Kendrick is a 50 year old male with a past medical history significant for hairy cell leukemia (HCL) who presents with fevers. Patient was diagnosed with HCL in 2010.  He was treated with cladribine at the time and has been in remission.  More recently, he was unfortunately found to have recurrence of his leukemia confirmed on flow cytometry.  He is undergoing treatment with Rituxan x 4 along with cladribine.  He received  the cladribine about 3 weeks ago and currently getting weekly infusion of rituximab, with the last dose due this Friday.  Has been on Bactrim and acyclovir for prophylaxis.    He presented with 4 days onset of fevers, runny nose, occasional cough and also a recent rash. He was found to be neutropenic with high-grade fevers and admitted to the hospital. He reports that he had a similar admission 10 years ago and no infectious source was found at the time.     #Febrile neutropenia  #SIRS response secondary to the above  Patient had a high-grade fever up to 103 on admission, white cells of 0.2, febrile and tachycardic with meeting SIRS criteria.  Cladribine can induce fevers mediated through a cytokine storm, although he is a few weeks out from it, another possibility would be tumor fever and certainly this could be noninfectious in etiology.  Nevertheless given his neutropenia, he needs to be covered for infectious sources at this time as well until infection is ruled out and his fevers improved.  Does have a PICC line in place.  COVID-19 testing negative. Chest x-ray, UA negative    -Continue IV Piperacillin/Tazobactam. Stopped vancomycin   -Continue acyclovir, hold Bactrim for now  -Follow blood cultures  --Discussed with oncology, he will stay neutropenic for a while, so the goal would be improvement in fevers prior to  discharge  -Requested ID consultation    #Pancytopenia.  Secondary to recent chemotherapy.  Monitor cell counts.      #Previous history of DVT associated with PICC line.  Patient is currently on Xarelto prophylactically.  He denies having any DVT recently.  Resumed     #Generalized rash.  Patient reports that this is attributed to the cladribine.  Could also be related to the Bactrim. Monitor.  I did discuss with the patient that we do not have inpatient dermatology available at this facility, so option would be outpatient follow-up versus transfer to different facility for inpatient consult.  He prefers to follow them up as an outpatient in case it does not resolve.  -Continue to hold the Bactrim, monitor the rash     #Mild hyponatremia. Resolved    #GLADYS.    Patient with increasing creatinine to 1.55 today.   Discussed the need to minimize NSAID use, would leave a low-dose of ibuprofen available only if his fevers are refractory to Tylenol.  He is in agreement, he does feel like ibuprofen has been the most helpful thing for his fever.  Could also be related to the vancomycin.  His levels are okay.  -Stop vancomycin, Continue IV fluids.  Monitor creatinine       Diet: Combination Diet Regular Diet Adult    DVT Prophylaxis: Xarelto  Moralez Catheter: not present  Code Status: Full Code      Disposition Plan   Expected discharge: 2 - 3 days, recommended to prior living arrangement once fevers staying below 100.  Entered: Edna Louis MD 09/17/2020, 1:18 PM       The patient's care was discussed with the Bedside Nurse and Patient.    Edna Louis MD  Hospitalist Service  [facility]    ______________________________________________________________________    Interval History   Chart reviewed and patient seen. Case discussed with nursing staff.     Patient feels quite well for the most part, up and about, tolerating oral intake, feels like his strength is good, no sign of bleeding, continues to  have fevers, he thinks that the fever finally broke today with help from ibuprofen, no other complaints.      Data reviewed today: I reviewed all medications, new labs and imaging results over the last 24 hours. I personally reviewed    Physical Exam   Vital Signs: Temp: 98.5  F (36.9  C) Temp src: Oral BP: 93/58 Pulse: 94   Resp: 20 SpO2: 100 % O2 Device: None (Room air)    Weight: 251 lbs 3.2 oz    GENERAL:  Awake and alert, Comfortable appearing, No acute distress.  PSYCH: Pleasant, Cooperative, appropriate affect, no hallucinations   EYES: EOMI, conjunctiva clear, no redness noted  HEENT:  Head is normocephalic, atraumatic, Neck is Supple, trachea is midline   CARDIOVASCULAR: Regular rate and rhythm, Normal S1, S2, no loud murmurs, no rubs or gallops.  Tachycardic  PULMONARY:  Clear to auscultation bilaterally with good entry on both sides  CHEST: Good inspiratory effort bilaterally   GI: Abdomen is soft, non tender, non-distended, normal bowel sounds. No rebound or guarding   SKIN:  Dry, warm to touch.  Generalized macular rash, appears to have some petechial features on the lower extremities  EXTREMITIES:  Good capillary refill with signs of adequate peripheral perfusion. No peripheral edema   Neuro: Awake and oriented x 3. No focal weakness or numbness is noted. Moving all extremities with good strength, Grossly non-focal.   MSK: Good range of motion in all major joints of upper and lower extremity, No acute joint synovitis of the major joints is noted.        Data   Recent Labs   Lab 09/17/20  0632 09/16/20  0530 09/15/20  1714   WBC 0.3* 0.2* 0.2*   HGB 7.0* 8.1* 9.2*   * 101* 97   PLT 53* 74* 86*   INR  --   --  1.79*    133 132*   POTASSIUM 3.8 3.8 4.1   CHLORIDE 108 102 102   CO2 23 24 25   BUN 18 23 24   CR 1.55* 1.40* 1.21   ANIONGAP 6 7 5   TAMICA 7.6* 7.4* 8.3*   * 122* 145*   ALBUMIN  --   --  2.6*   PROTTOTAL  --   --  6.4*   BILITOTAL  --   --  0.7   ALKPHOS  --   --  64   ALT  --    --  40   AST  --   --  76*       No results found for this or any previous visit (from the past 24 hour(s)).  Medications     0.9% sodium chloride + KCl 20 mEq/L 100 mL/hr at 09/17/20 0507     - MEDICATION INSTRUCTIONS -         sodium chloride 0.9%  1,000 mL Intravenous Once     acyclovir  400 mg Oral Q12H     piperacillin-tazobactam  3.375 g Intravenous Q6H     rivaroxaban ANTICOAGULANT  10 mg Oral Daily     sodium chloride (PF)  10 mL Intracatheter Q7 Days     sodium chloride (PF)  3 mL Intracatheter Q8H

## 2020-09-17 NOTE — CONSULTS
Consult Date:  09/17/2020      INFECTIOUS DISEASE CONSULTATION      REFERRING PHYSICIAN:  Edna oLuis MD.      LOCATION:  Room 525, [facility].      IMPRESSION:   1.  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   2.  Rash, felt likely related to his chemotherapy agent, which caused a rash on his last cycle 10 years ago (cladribine), also getting Rituxan.  Has been on Septra prophylaxis, so that is a conceivable alternative cause to rash.   3.  Acute fever and neutropenia, fully neutropenic at this point.  No localizing symptoms and so far, cultures negative.  Continued fever on Zosyn so far.   4.  Prior PICC line-related deep venous thrombosis.      RECOMMENDATIONS:   1.  Agree with holding Septra.  He is getting acyclovir prophylactically as well.   2.  Continue Zosyn for now.   3.  Follow fever, clinical symptoms and white count.  If continued neutropenia with ongoing fever, potentially expand and alter treatment, as well as further workup if any symptoms develop.   4.  If fever resolves and neutropenia resolves, minimal treatment should be needed.      HISTORY OF PRESENT ILLNESS:  This 50-year-old male is seen in consultation due to neutropenic fever.  The patient has a history of hairy cell leukemia going back 10 years ago.  When he had a years ago, he had a similar episode to currently with fever hospitalization and rash.  It was felt to be the cladribine drug at that time but never clear and did not have any proven infections.  The patient went into remission and has been okay for the last 10 years including no infection problems during that time.  He has had no recent travels or exposures but has been found to have a relapse of the hairy cell leukemia, and treatment has been reinitiated with Rituxan and cladribine.  He got his cycle and then developed a rash about 10 days ago.  He was also on Bactrim  prophylaxis at that time, so conceivable alternative cause.  Did not have fever initially nor neutropenia as recently as late last week.  Then over the weekend, he started having fever and now with a full neutropenia and fever 2 days into this.  So far cultures negative.  No localizing symptoms or infection and cultures so far negative.      PAST MEDICAL HISTORY:  Hairy cell leukemia as noted, prior deep venous thrombosis from a PICC line.  No other major medical problems in general.      ALLERGIES:  NONE LISTED.  I SUPPOSE CONCEIVABLE BACTRIM.         SOCIAL AND FAMILY HISTORY:  No recent travel or exposures.  No one else he has been around has been ill.      MEDICATIONS:  As listed.      REVIEW OF SYSTEMS:  Unremarkable otherwise.  No focal symptoms.  Feels okay when the temperature is down.      PHYSICAL EXAMINATION:   GENERAL:  The patient appears his stated age, does not look particularly ill.   VITAL SIGNS:  Currently normal, earlier temperature 102 and tachycardic at the time.   HEENT:  Acyclovir prophylaxis.  No obvious lesions.  Rash partially involving the face.   NECK:  Supple and nontender.   HEART:  Regular rhythm, not tachycardic.   LUNGS:  Clear.   ABDOMEN:  Nontender, even to deep palpation.   EXTREMITIES:  Diffuse erythematous rash, consistent with a drug eruption.  Some mild petechial changes on the legs as well.      LABORATORY DATA:  PMN count essentially 0, hemoglobin 7, platelet count 53,000.  Cultures so far negative.      Thank you very much for the consultation.  I will follow the patient with you.         NURIS HANKS MD             D: 2020   T: 2020   MT:       Name:     KENNETH CANTU   MRN:      [id number]        Account:       [id number]   :      1970           Consult Date:  2020      Document: [id number]       cc: Edna Louis MD

## 2020-09-17 NOTE — PROGRESS NOTES
Oncology/Hematology Follow Up Note:    Assessment and Plan:  Alfonso Kendrick is a 50 year old male patient admitted 9/15, with neutropenic fevers     1.Hairy cell Leukemia  -Hairy cell leukemia diagnosed in 2010 treated with cladribine when he had pancytopenia and splenomegaly.  - Recurrence noted in August 2020 with neutropenia and thrombocytopenia and resumed treatment with cladribine and weekly rituximab so far has had 3 of 4 weeks of rituximab  -Received cladribine is a 5-day infusion from August 19 through August 24  -Received G-CSF or Neulasta on August 24.    PLAN:   - last dose of Ruxience( biosimilar) on 9/17, will hold for now till improvement  - No futher GCSf indicated as received.     2. Neutropenic fever, T max 102.9 F  - has had COVID test done once as Op and negative.  -Covid-19 test negative again on admission.  - Agree with broad-spectrum antibiotics and blood cultures and fever work-up is done  -This could also be an immune reaction/ cytokine storm from his hairy cell and treatment with cladribine and Rituxan  -He had a very similar picture to this in 2010 when treated  - Toradol helped with fever    PLAN:  -Continue vanco/ pip/tazo  -If rash worsens stop vanco( though rash precedes this.)  - need to have a lower fever curve for 24 hours prior to home, anticipate his neutropenia will be prolonged.  - Continue Acyclovir.  - With rash, hold bactrim    3. Rash,   - Extensive, erythematous maculopapular rash covering, large areas on lower extremities arms and trunk.  Non blanching.  - Presumed from drug vs immune reaction post caldribine.  -Please consider consultation with dermatology.     4. FULL CODE    5. DVT prophylaxis  - On rivoroxban 10mg PO daily for PICC line , previous DVT with PICC    PLAN: continue with PLT> 50 k      Subjective:    Continues to have high-grade fever and extensive rash, however no localizing symptoms such as cough, diarrhea, dysuria.  Denies significant pain.  Patient  did report that he was having some blurriness of his vision, prior to starting the symptom complex.     Scheduled Medications:  Reviewed active medications    Labs:  CBC RESULTS:   Recent Labs   Lab Test 09/16/20  0530 09/15/20  1714 08/17/20  0815   WBC 0.2* 0.2* 4.7   HGB 8.1* 9.2* 13.2*   HCT 24.9* 27.0* 40.2   * 97 100   PLT 74* 86* 56*       CMP  Recent Labs   Lab 09/17/20  0632 09/16/20  0530 09/15/20  1714    133 132*   POTASSIUM 3.8 3.8 4.1   CHLORIDE 108 102 102   CO2 23 24 25   ANIONGAP 6 7 5   * 122* 145*   BUN 18 23 24   CR 1.55* 1.40* 1.21   GFRESTIMATED 52* 58* 70   GFRESTBLACK 60* 68 81   TAMICA 7.6* 7.4* 8.3*   MAG  --  1.7  --    PROTTOTAL  --   --  6.4*   ALBUMIN  --   --  2.6*   BILITOTAL  --   --  0.7   ALKPHOS  --   --  64   AST  --   --  76*   ALT  --   --  40       INR  Recent Labs   Lab 09/15/20  1714   INR 1.79*       Objective/Physical Exam:  Blood pressure 98/63, pulse 107, temperature 98  F (36.7  C), temperature source Oral, resp. rate 20, height 1.829 m (6'), weight 113.9 kg (251 lb 3.2 oz), SpO2 97 %.  General appearance:alert, oriented, no acute distress  HEENT:sclera anicteric, no pallor, no thrush or mucositis.  Lungs: chest is clear to auscultation, no rales or rhonchi appreciated.  Abdomen: soft, NT, ND , BS normal  Ext:  Significant rash noted over lower extremity , arms and torso.     Juanito STEPHENS  [facility]

## 2020-09-17 NOTE — PLAN OF CARE
End of Shift Summary  For vital signs and complete assessments, please see documentation flowsheets.     Pertinent assessments: A&O x 4, tachy, on RA, T-Max 101.3. Given tylenol x 1. Denies any pain or nausea. Rash throughout body no itching or pain reported. Hem/Onc, Hospitalist, following. Possible derm consult out pt vs inpt.      Major Shift Events: T-max: 101.3  Treatment Plan: Await blood culture final results, Vanco - dc'd, Zosyn, Acyclovir, Xarelto, IVF, monitor temps

## 2020-09-17 NOTE — PLAN OF CARE
End of Shift Summary  For vital signs and complete assessments, please see documentation flowsheets.     Pertinent assessments: A&O x4, up independent. T-max: 100.2, tylenol given. No pain or nausea. Rash throughout body no itching or pain reported regarding that. PICC infusing @100. Vanco & zosyn. Await blood cultures.   Major Shift Events: T-max: 100.2, tylenol given.   Treatment Plan: await blood culture results, vanco & zosyn. IVF. Monitor temps

## 2020-09-18 LAB
ANION GAP SERPL CALCULATED.3IONS-SCNC: 7 MMOL/L (ref 3–14)
BASOPHILS # BLD AUTO: 0 10E9/L (ref 0–0.2)
BASOPHILS NFR BLD AUTO: 0 %
BUN SERPL-MCNC: 21 MG/DL (ref 7–30)
CALCIUM SERPL-MCNC: 7.4 MG/DL (ref 8.5–10.1)
CHLORIDE SERPL-SCNC: 108 MMOL/L (ref 94–109)
CO2 SERPL-SCNC: 23 MMOL/L (ref 20–32)
CREAT SERPL-MCNC: 1.35 MG/DL (ref 0.66–1.25)
DIFFERENTIAL METHOD BLD: ABNORMAL
EOSINOPHIL # BLD AUTO: 0.1 10E9/L (ref 0–0.7)
EOSINOPHIL NFR BLD AUTO: 26 %
ERYTHROCYTE [DISTWIDTH] IN BLOOD BY AUTOMATED COUNT: 13.4 % (ref 10–15)
GFR SERPL CREATININE-BSD FRML MDRD: 61 ML/MIN/{1.73_M2}
GLUCOSE SERPL-MCNC: 111 MG/DL (ref 70–99)
HCT VFR BLD AUTO: 22.1 % (ref 40–53)
HGB BLD-MCNC: 7 G/DL (ref 13.3–17.7)
LACTATE BLD-SCNC: 1.3 MMOL/L (ref 0.7–2)
LYMPHOCYTES # BLD AUTO: 0.3 10E9/L (ref 0.8–5.3)
LYMPHOCYTES NFR BLD AUTO: 54 %
MCH RBC QN AUTO: 32.9 PG (ref 26.5–33)
MCHC RBC AUTO-ENTMCNC: 31.7 G/DL (ref 31.5–36.5)
MCV RBC AUTO: 104 FL (ref 78–100)
MONOCYTES # BLD AUTO: 0 10E9/L (ref 0–1.3)
MONOCYTES NFR BLD AUTO: 4 %
NEUTROPHILS # BLD AUTO: 0.1 10E9/L (ref 1.6–8.3)
NEUTROPHILS NFR BLD AUTO: 16 %
PLATELET # BLD AUTO: 56 10E9/L (ref 150–450)
PLATELET # BLD EST: ABNORMAL 10*3/UL
POIKILOCYTOSIS BLD QL SMEAR: SLIGHT
POTASSIUM SERPL-SCNC: 3.8 MMOL/L (ref 3.4–5.3)
RBC # BLD AUTO: 2.13 10E12/L (ref 4.4–5.9)
RBC INCLUSIONS BLD: SLIGHT
SODIUM SERPL-SCNC: 138 MMOL/L (ref 133–144)
WBC # BLD AUTO: 0.5 10E9/L (ref 4–11)

## 2020-09-18 PROCEDURE — 25000128 H RX IP 250 OP 636: Performed by: INTERNAL MEDICINE

## 2020-09-18 PROCEDURE — 25000132 ZZH RX MED GY IP 250 OP 250 PS 637: Performed by: INTERNAL MEDICINE

## 2020-09-18 PROCEDURE — 80048 BASIC METABOLIC PNL TOTAL CA: CPT | Performed by: INTERNAL MEDICINE

## 2020-09-18 PROCEDURE — 12000000 ZZH R&B MED SURG/OB

## 2020-09-18 PROCEDURE — 99232 SBSQ HOSP IP/OBS MODERATE 35: CPT | Performed by: INTERNAL MEDICINE

## 2020-09-18 PROCEDURE — 83605 ASSAY OF LACTIC ACID: CPT | Performed by: INTERNAL MEDICINE

## 2020-09-18 PROCEDURE — 85025 COMPLETE CBC W/AUTO DIFF WBC: CPT | Performed by: INTERNAL MEDICINE

## 2020-09-18 RX ORDER — IBUPROFEN 200 MG
200 TABLET ORAL EVERY 6 HOURS PRN
Status: DISCONTINUED | OUTPATIENT
Start: 2020-09-18 | End: 2020-09-21 | Stop reason: HOSPADM

## 2020-09-18 RX ORDER — FLUTICASONE PROPIONATE 50 MCG
1 SPRAY, SUSPENSION (ML) NASAL 2 TIMES DAILY PRN
Status: DISCONTINUED | OUTPATIENT
Start: 2020-09-18 | End: 2020-09-21 | Stop reason: HOSPADM

## 2020-09-18 RX ADMIN — TAZOBACTAM SODIUM AND PIPERACILLIN SODIUM 3.38 G: 375; 3 INJECTION, SOLUTION INTRAVENOUS at 20:36

## 2020-09-18 RX ADMIN — TEMAZEPAM 15 MG: 15 CAPSULE ORAL at 22:34

## 2020-09-18 RX ADMIN — TAZOBACTAM SODIUM AND PIPERACILLIN SODIUM 3.38 G: 375; 3 INJECTION, SOLUTION INTRAVENOUS at 14:08

## 2020-09-18 RX ADMIN — POTASSIUM CHLORIDE AND SODIUM CHLORIDE: 900; 150 INJECTION, SOLUTION INTRAVENOUS at 01:40

## 2020-09-18 RX ADMIN — ACYCLOVIR 400 MG: 400 TABLET ORAL at 11:32

## 2020-09-18 RX ADMIN — ACETAMINOPHEN 650 MG: 325 TABLET, FILM COATED ORAL at 15:43

## 2020-09-18 RX ADMIN — ACYCLOVIR 400 MG: 400 TABLET ORAL at 22:34

## 2020-09-18 RX ADMIN — RIVAROXABAN 10 MG: 10 TABLET, FILM COATED ORAL at 08:41

## 2020-09-18 RX ADMIN — MELATONIN TAB 3 MG 3 MG: 3 TAB at 22:34

## 2020-09-18 RX ADMIN — TAZOBACTAM SODIUM AND PIPERACILLIN SODIUM 3.38 G: 375; 3 INJECTION, SOLUTION INTRAVENOUS at 08:41

## 2020-09-18 RX ADMIN — ACETAMINOPHEN 650 MG: 325 TABLET, FILM COATED ORAL at 20:52

## 2020-09-18 RX ADMIN — OMEPRAZOLE 20 MG: 20 CAPSULE, DELAYED RELEASE ORAL at 08:41

## 2020-09-18 RX ADMIN — TAZOBACTAM SODIUM AND PIPERACILLIN SODIUM 3.38 G: 375; 3 INJECTION, SOLUTION INTRAVENOUS at 01:41

## 2020-09-18 RX ADMIN — KETOTIFEN FUMARATE 1 DROP: 0.35 SOLUTION/ DROPS OPHTHALMIC at 08:42

## 2020-09-18 ASSESSMENT — ACTIVITIES OF DAILY LIVING (ADL)
ADLS_ACUITY_SCORE: 11

## 2020-09-18 NOTE — PLAN OF CARE
Pt A&O x4, up in room ad jose enrique. VSS, afebrile. On neutropenic precautions. Hairy Cell leukemia replace. Following out pt with oncology. Admit with continual fevers. Infection source unkown- see ID note. On Bactrim, Acyclovir and Bactrim for prophylaxis. PICC in R arm. WBC 0.5, hgb 7.0. Per oncology no transfusions necessary at this time. Generalized body rash r/t chemo meds. Possible discharge back home tomorrow.  Will continue POC.

## 2020-09-18 NOTE — PROGRESS NOTES
Oncology/Hematology Follow Up Note:    Assessment and Plan:    Alfonso Kendrick is a 50 year old male patient admitted 9/15, with neutropenic fevers      1.Hairy cell Leukemia  -Hairy cell leukemia diagnosed in 2010 treated with cladribine when he had pancytopenia and splenomegaly.  - Recurrence noted in August 2020 with neutropenia and thrombocytopenia and resumed treatment with cladribine and weekly rituximab so far has had 3 of 4 weeks of rituximab  -Received cladribine is a 5-day infusion from August 19 through August 24  -Received G-CSF or Neulasta on August 24.     PLAN:   - last dose of Ruxience( biosimilar) on 9/17, will hold for now till improvement  - No futher GCSf indicated as received.      2. Neutropenic fever, T max 102.7 F at 6 am on 9/17( better since then)  - has had COVID test done once as Op and negative.  -Covid-19 test negative again on admission.  - Agree with broad-spectrum antibiotics and blood cultures and fever work-up is done  -This could also be an immune reaction/ cytokine storm from his hairy cell and treatment with cladribine and Rituxan  -He had a very similar picture to this in 2010 when treated  - Toradol helped with fever     PLAN:  -Continue  pip/tazo  - need to have a lower fever curve for 24 hours prior to home, anticipate his neutropenia will be prolonged.  - Continue Acyclovir.  - With rash, hold bactrim     3. Rash,   - Extensive, erythematous maculopapular rash covering, large areas on lower extremities arms and trunk.  Non blanching.  - Presumed from drug vs immune reaction post caldribine.      PLAN: OP derm, he is asymptomatic and rash is better per him     4. FULL CODE     5. DVT prophylaxis  - On rivoroxban 10mg PO daily for PICC line , previous DVT with PICC     PLAN: continue with PLT> 50 k    6. Anemia  HB at 7, but no s/s, hold PRBC    PLAN: If fever better today, home tomorrow and OP Follow-up with us next week. T max was now 24 hours ago, would observe for 24  more hours and then home on PO ATBX per ID.    Will update Dr. Hollis.       Subjective:     He feels so much better, fever better, rash better per him, good spirits  Scheduled Medications:  Reviewed active medications    Labs:  CBC RESULTS:   Recent Labs   Lab Test 09/18/20  0650 09/17/20  0632 09/16/20  0530   WBC 0.5* 0.3* 0.2*   HGB 7.0* 7.0* 8.1*   HCT 22.1* 21.4* 24.9*   * 101* 101*   PLT 56* 53* 74*       CMP  Recent Labs   Lab 09/18/20  0650 09/17/20  0632 09/16/20  0530 09/15/20  1714    137 133 132*   POTASSIUM 3.8 3.8 3.8 4.1   CHLORIDE 108 108 102 102   CO2 23 23 24 25   ANIONGAP 7 6 7 5   * 119* 122* 145*   BUN 21 18 23 24   CR 1.35* 1.55* 1.40* 1.21   GFRESTIMATED 61 52* 58* 70   GFRESTBLACK 71 60* 68 81   TMAICA 7.4* 7.6* 7.4* 8.3*   MAG  --   --  1.7  --    PROTTOTAL  --   --   --  6.4*   ALBUMIN  --   --   --  2.6*   BILITOTAL  --   --   --  0.7   ALKPHOS  --   --   --  64   AST  --   --   --  76*   ALT  --   --   --  40       INR  Recent Labs   Lab 09/15/20  1714   INR 1.79*       Objective/Physical Exam:  Blood pressure 104/59, pulse 99, temperature 98.2  F (36.8  C), temperature source Oral, resp. rate 18, height 1.829 m (6'), weight 113.9 kg (251 lb 3.2 oz), SpO2 98 %.  General appearance:alert, oriented, no acute distress    Ext: Rash worse from Wed but better per patient, now on legs, arms a and a few on trunk    Terry Maldonado MD  [facility]  9/18/2020 9:04 AM

## 2020-09-18 NOTE — PLAN OF CARE
End of Shift Summary  For vital signs and complete assessments, please see documentation flowsheets.     Pertinent assessments: A&O x 4. Temp max 100.7 - tylenol, ibuprofen given.  Denies pain. Up independent in room.     Treatment Plan: Await blood culture final results, Zosyn, Acyclovir, Xarelto, IVF, monitor temps    Bedside Nurse: Radha Clemente RN

## 2020-09-18 NOTE — PROGRESS NOTES
[facility]  Infectious Disease Progress Note          Assessment and Plan:   IMPRESSION:   1.  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   2.  Rash, felt likely related to his chemotherapy agent, which caused a rash on his last cycle 10 years ago (cladribine), also getting Rituxan.  Has been on Septra prophylaxis, so that is a conceivable alternative cause to rash.   3.  Acute fever and neutropenia, fully neutropenic at this point.  No localizing symptoms and so far, cultures negative.  Continued fever on Zosyn so far.   4.  Prior PICC line-related deep venous thrombosis.      RECOMMENDATIONS:   1.  Agree with holding Septra.  He is getting acyclovir prophylactically as well.   2.  Continue Zosyn for now. T down  3.  Follow fever, clinical symptoms and white count.  If continued neutropenia with ongoing fever, potentially expand and alter treatment, as well as further workup if any symptoms develop.   4.  If fever resolves and neutropenia resolves, minimal treatment should be needed.              Interval History:   no new complaints and doing well; no cp, sob, n/v/d, or abd pain. T down, cxs neg, some WBC to 500 80 PMNs              Medications:       sodium chloride 0.9%  1,000 mL Intravenous Once     acyclovir  400 mg Oral Q12H     omeprazole  20 mg Oral Daily     piperacillin-tazobactam  3.375 g Intravenous Q6H     rivaroxaban ANTICOAGULANT  10 mg Oral Daily     sodium chloride (PF)  10 mL Intracatheter Q7 Days     sodium chloride (PF)  3 mL Intracatheter Q8H                  Physical Exam:   Blood pressure 104/59, pulse 99, temperature 98.9  F (37.2  C), temperature source Oral, resp. rate 18, height 1.829 m (6'), weight 113.9 kg (251 lb 3.2 oz), SpO2 98 %.  Wt Readings from Last 2 Encounters:   09/15/20 113.9 kg (251 lb 3.2 oz)   08/17/20 117 kg (258 lb)     Vital Signs with Ranges  Temp:  [98.2   F (36.8  C)-100.7  F (38.2  C)] 98.9  F (37.2  C)  Pulse:  [] 99  Resp:  [18-20] 18  BP: ()/(43-63) 104/59  SpO2:  [95 %-98 %] 98 %    Constitutional: Awake, alert, cooperative, no apparent distress   Lungs: Clear to auscultation bilaterally, no crackles or wheezing   Cardiovascular: Regular rate and rhythm, normal S1 and S2, and no murmur noted   Abdomen: Normal bowel sounds, soft, non-distended, non-tender   Skin: ? Sl better rashes, no cyanosis, no edema   Other:           Data:   All microbiology laboratory data reviewed.  Recent Labs   Lab Test 09/18/20  0650 09/17/20  0632 09/16/20  0530   WBC 0.5* 0.3* 0.2*   HGB 7.0* 7.0* 8.1*   HCT 22.1* 21.4* 24.9*   * 101* 101*   PLT 56* 53* 74*     Recent Labs   Lab Test 09/18/20  0650 09/17/20  0632 09/16/20  0530   CR 1.35* 1.55* 1.40*     No lab results found.  Recent Labs   Lab Test 09/15/20  1714 09/15/20  1712   CULT No growth after 3 days No growth after 3 days

## 2020-09-18 NOTE — PROGRESS NOTES
I was notified by nursing service that patient is frustrated about his recent fever spike and the unavailability of his earlier Ibuprofen.  He was receiving a low dose Ibuprofen with last dose 9/17  Spoke with the patient and he stated no side effects from it such as bleeding tendencies as he remained very High risk of bleeding in the setting of NOAC use, thrombocytopenia, and with already known anemia.  He wants the ibuprofen to be available for his fever spikes.  Discussed with him that will provide with ibuprofen with close monitoring as he understood the risks with it.    Telma

## 2020-09-18 NOTE — PROGRESS NOTES
[facility]  Hospitalist Progress Note  Mariusz Hollis MD, MD 09/18/2020  (Text Page)  Reason for Stay (Diagnosis): Neutropenic fever, pancytopenia secondary to chemotherapy         Assessment and Plan:      I have utilize excerpts of my colleagues prior progress notes as I took service on 9/18/2020 and has been in the hospital since 9/15/2020    Summary of Stay: Alfonso Kendrick is a 50 year old male with a past medical history significant for hairy cell leukemia (HCL) who presents with fevers. Patient was diagnosed with HCL in 2010.  He was treated with cladribine at the time and has been in remission.  More recently, he was unfortunately found to have recurrence of his leukemia confirmed on flow cytometry.  He is undergoing treatment with Rituxan x 4 along with cladribine.  He received  the cladribine about 3 weeks ago and currently getting weekly infusion of rituximab, with the last dose due this Friday.  Has been on Bactrim and acyclovir for prophylaxis.     He presented with 4 days onset of fevers, runny nose, occasional cough and also a recent rash. He was found to be neutropenic with high-grade fevers and admitted to the hospital. He reports that he had a similar admission 10 years ago and no infectious source was found at the time.     #Febrile neutropenia  #SIRS response secondary to the above  Patient had a high-grade fever up to 103 on admission, white cells of 0.2, febrile and tachycardic with meeting SIRS criteria.  Cladribine can induce fevers mediated through a cytokine storm, although he is a few weeks out from it, another possibility would be tumor fever and certainly this could be noninfectious in etiology.  Nevertheless given his neutropenia, he needs to be covered for infectious sources at this time as well until infection is ruled out and his fevers improved.  Does have a PICC line in place.  COVID-19 testing negative. Chest x-ray, UA negative     -Continue IV  Piperacillin/Tazobactam. Stopped vancomycin   -Continue acyclovir, hold Bactrim for now  -Follow blood cultures  --Discussed with oncology, he will stay neutropenic for a while, so the goal would be improvement in fevers prior to discharge  -ID service following with us   -We will await further instructions regarding antibiotics coverage.    #Pancytopenia.  Secondary to recent chemotherapy.  Monitor cell counts.   -No plans for transfusion for today as per oncology service  -Received G-CSF     #Previous history of DVT associated with PICC line.  Patient is currently on Xarelto prophylactically.  He denies having any DVT recently.  Resumed      #Generalized rash.  Patient reports that this is attributed to the cladribine.  Could also be related to the Bactrim. Monitor.  I did discuss with the patient that we do not have inpatient dermatology available at this facility, so option would be outpatient follow-up versus transfer to different facility for inpatient consult.  He prefers to follow them up as an outpatient in case it does not resolve.  -Continue to hold the Bactrim, monitor the rash  -Rash appears to be improving, none pruritus     #Mild hyponatremia. Resolved    #GLADYS.   -Stable creatinine levels.  Voiding freely.    Discussed the need to minimize NSAID use, would leave a low-dose of ibuprofen available only if his fevers are refractory to Tylenol.  He is in agreement, he does feel like ibuprofen has been the most helpful thing for his fever.  Could also be related to the vancomycin.  His levels are okay.  -He is off vancomycin  -May discontinue IV fluids today        Diet: Combination Diet Regular Diet Adult    DVT Prophylaxis: Xarelto  Moralez Catheter: not present  Code Status: Full Code          Disposition Plan  Anticipating hospital discharge going home likely in the next 24 hours as also being contemplated and planned by oncology service if he remained afebrile and continues to improve.          Interval  History (Subjective):      Assume service care today.  Seen and examined.  Chart reviewed.  Case discussed with nursing service.  Case discussed with oncology service.  Alfonso thinks he is improving as currently afebrile this morning.  He denies any ongoing nausea, vomiting, diarrhea.  No reported mental status changes.  Slept decent overnight.  He also thinks that he is rashes are improving.  Denies any pruritus from it     # Pain Assessment:  Current Pain Score 9/18/2020   Patient currently in pain? denies   Pain score (0-10) 0   Pain location -   Pain descriptors -   Alfonso uriostegui pain level was assessed and he currently denies pain.                  Physical Exam:      Last Vital Signs:  /59 (BP Location: Left arm)   Pulse 99   Temp 98.2  F (36.8  C) (Oral)   Resp 18   Ht 1.829 m (6')   Wt 113.9 kg (251 lb 3.2 oz)   SpO2 98%   BMI 34.07 kg/m      I/O last 3 completed shifts:  In: 2892 [P.O.:1280; I.V.:1612]  Out: 1475 [Urine:1475]  Wt Readings from Last 1 Encounters:   09/15/20 113.9 kg (251 lb 3.2 oz)     Vitals:    09/15/20 1620 09/15/20 2221   Weight: 112.5 kg (248 lb) 113.9 kg (251 lb 3.2 oz)       Constitutional: Awake, alert, cooperative, no apparent distress   Respiratory: Clear to auscultation bilaterally, no crackles or wheezing   Cardiovascular: Regular rate and rhythm, normal S1 and S2, and no murmur noted   Abdomen: Normal bowel sounds, soft, non-distended, non-tender   Skin:  Generalized macular rashes, no cyanosis, dry to touch   Neuro: Alert and oriented x3, no weakness, spontaneous and coherent speech   Extremities: No edema, normal range of motion   Other(s): Euthymic mood, not agitated       All other systems: Negative          Medications:      All current medications were reviewed with changes reflected in problem list.         Data:      All new lab and imaging data was reviewed.   Labs:  Recent Labs   Lab 09/15/20  1714 09/15/20  1712   CULT No growth after 3 days No growth after 3 days      Recent Labs   Lab 09/18/20  0650 09/17/20  0632 09/16/20  0530   WBC 0.5* 0.3* 0.2*   HGB 7.0* 7.0* 8.1*   HCT 22.1* 21.4* 24.9*   * 101* 101*   PLT 56* 53* 74*     Recent Labs   Lab 09/18/20  0650 09/17/20  0632 09/16/20  0530 09/15/20  1714    137 133 132*   POTASSIUM 3.8 3.8 3.8 4.1   CHLORIDE 108 108 102 102   CO2 23 23 24 25   ANIONGAP 7 6 7 5   * 119* 122* 145*   BUN 21 18 23 24   CR 1.35* 1.55* 1.40* 1.21   GFRESTIMATED 61 52* 58* 70   GFRESTBLACK 71 60* 68 81   TAMICA 7.4* 7.6* 7.4* 8.3*   MAG  --   --  1.7  --    PROTTOTAL  --   --   --  6.4*   ALBUMIN  --   --   --  2.6*   BILITOTAL  --   --   --  0.7   ALKPHOS  --   --   --  64   AST  --   --   --  76*   ALT  --   --   --  40     No results for input(s): SED, CRP in the last 168 hours.  Recent Labs   Lab 09/18/20  0650 09/17/20  0632 09/16/20  0530 09/15/20  1714   * 119* 122* 145*     Recent Labs   Lab 09/15/20  1714   INR 1.79*     No results for input(s): TROPONIN, TROPI, TROPR in the last 168 hours.    Invalid input(s): TROP, TROPONINIES  Recent Labs   Lab 09/15/20  1803   COLOR Yellow   APPEARANCE Clear   URINEGLC 30*   URINEBILI Negative   URINEKETONE Trace*   SG 1.035   UBLD Small*   URINEPH 6.0   PROTEIN 300*   NITRITE Negative   LEUKEST Negative   RBCU 1   WBCU 4      Imaging:   Results for orders placed or performed during the hospital encounter of 09/15/20   XR Chest Port 1 View    Narrative    EXAM: XR CHEST PORT 1 VW  LOCATION: [facility]  DATE/TIME: 9/15/2020 5:11 PM    INDICATION: Fever, unknown source  COMPARISON: 11/14/2010      Impression    IMPRESSION: Right-sided PICC line with tip over atrial caval junction. No pneumothorax or pleural effusion. No focal consolidation. Cardiac silhouette within normal limits. No acute osseous abnormality.

## 2020-09-19 ENCOUNTER — APPOINTMENT (OUTPATIENT)
Dept: CT IMAGING | Facility: CLINIC | Age: 50
DRG: 808 | End: 2020-09-19
Attending: INTERNAL MEDICINE
Payer: COMMERCIAL

## 2020-09-19 LAB
BASOPHILS # BLD AUTO: 0 10E9/L (ref 0–0.2)
BASOPHILS NFR BLD AUTO: 0 %
CREAT SERPL-MCNC: 1.36 MG/DL (ref 0.66–1.25)
DACRYOCYTES BLD QL SMEAR: SLIGHT
DIFFERENTIAL METHOD BLD: ABNORMAL
EOSINOPHIL # BLD AUTO: 0 10E9/L (ref 0–0.7)
EOSINOPHIL NFR BLD AUTO: 4.5 %
ERYTHROCYTE [DISTWIDTH] IN BLOOD BY AUTOMATED COUNT: 13.5 % (ref 10–15)
GFR SERPL CREATININE-BSD FRML MDRD: 61 ML/MIN/{1.73_M2}
HCT VFR BLD AUTO: 22.7 % (ref 40–53)
HGB BLD-MCNC: 7.3 G/DL (ref 13.3–17.7)
IMM PLASMA CELLS NFR BLD: 0.5 %
LACTATE BLD-SCNC: 1.2 MMOL/L (ref 0.7–2)
LYMPHOCYTES # BLD AUTO: 0.7 10E9/L (ref 0.8–5.3)
LYMPHOCYTES NFR BLD AUTO: 83.5 %
MCH RBC QN AUTO: 32.4 PG (ref 26.5–33)
MCHC RBC AUTO-ENTMCNC: 32.2 G/DL (ref 31.5–36.5)
MCV RBC AUTO: 101 FL (ref 78–100)
MONOCYTES # BLD AUTO: 0.1 10E9/L (ref 0–1.3)
MONOCYTES NFR BLD AUTO: 7.5 %
NEUTROPHILS # BLD AUTO: 0 10E9/L (ref 1.6–8.3)
NEUTROPHILS NFR BLD AUTO: 4 %
NRBC # BLD AUTO: 0 10*3/UL
NRBC BLD AUTO-RTO: 1 /100
PLASMA CELLS # BLD MANUAL: 0 10E9/L
PLATELET # BLD AUTO: 63 10E9/L (ref 150–450)
PLATELET # BLD EST: ABNORMAL 10*3/UL
POIKILOCYTOSIS BLD QL SMEAR: SLIGHT
RBC # BLD AUTO: 2.25 10E12/L (ref 4.4–5.9)
RBC INCLUSIONS BLD: SLIGHT
VARIANT LYMPHS BLD QL SMEAR: PRESENT
WBC # BLD AUTO: 0.8 10E9/L (ref 4–11)

## 2020-09-19 PROCEDURE — 12000000 ZZH R&B MED SURG/OB

## 2020-09-19 PROCEDURE — 70486 CT MAXILLOFACIAL W/O DYE: CPT

## 2020-09-19 PROCEDURE — 85025 COMPLETE CBC W/AUTO DIFF WBC: CPT | Performed by: INTERNAL MEDICINE

## 2020-09-19 PROCEDURE — 99233 SBSQ HOSP IP/OBS HIGH 50: CPT | Performed by: INTERNAL MEDICINE

## 2020-09-19 PROCEDURE — 82565 ASSAY OF CREATININE: CPT | Performed by: INTERNAL MEDICINE

## 2020-09-19 PROCEDURE — 25000132 ZZH RX MED GY IP 250 OP 250 PS 637: Performed by: INTERNAL MEDICINE

## 2020-09-19 PROCEDURE — 25000128 H RX IP 250 OP 636: Performed by: INTERNAL MEDICINE

## 2020-09-19 PROCEDURE — 83605 ASSAY OF LACTIC ACID: CPT | Performed by: INTERNAL MEDICINE

## 2020-09-19 RX ADMIN — RIVAROXABAN 10 MG: 10 TABLET, FILM COATED ORAL at 08:20

## 2020-09-19 RX ADMIN — KETOTIFEN FUMARATE 1 DROP: 0.35 SOLUTION/ DROPS OPHTHALMIC at 02:35

## 2020-09-19 RX ADMIN — FLUTICASONE PROPIONATE 1 SPRAY: 50 SPRAY, METERED NASAL at 03:02

## 2020-09-19 RX ADMIN — OMEPRAZOLE 20 MG: 20 CAPSULE, DELAYED RELEASE ORAL at 08:20

## 2020-09-19 RX ADMIN — ACYCLOVIR 400 MG: 400 TABLET ORAL at 22:29

## 2020-09-19 RX ADMIN — ACETAMINOPHEN 650 MG: 325 TABLET, FILM COATED ORAL at 15:46

## 2020-09-19 RX ADMIN — MELATONIN TAB 3 MG 3 MG: 3 TAB at 22:29

## 2020-09-19 RX ADMIN — TAZOBACTAM SODIUM AND PIPERACILLIN SODIUM 3.38 G: 375; 3 INJECTION, SOLUTION INTRAVENOUS at 02:33

## 2020-09-19 RX ADMIN — TAZOBACTAM SODIUM AND PIPERACILLIN SODIUM 3.38 G: 375; 3 INJECTION, SOLUTION INTRAVENOUS at 20:29

## 2020-09-19 RX ADMIN — ACETAMINOPHEN 650 MG: 325 TABLET, FILM COATED ORAL at 03:02

## 2020-09-19 RX ADMIN — ALTEPLASE 2 MG: 2.2 INJECTION, POWDER, LYOPHILIZED, FOR SOLUTION INTRAVENOUS at 19:46

## 2020-09-19 RX ADMIN — TAZOBACTAM SODIUM AND PIPERACILLIN SODIUM 3.38 G: 375; 3 INJECTION, SOLUTION INTRAVENOUS at 14:52

## 2020-09-19 RX ADMIN — ACYCLOVIR 400 MG: 400 TABLET ORAL at 11:30

## 2020-09-19 RX ADMIN — TAZOBACTAM SODIUM AND PIPERACILLIN SODIUM 3.38 G: 375; 3 INJECTION, SOLUTION INTRAVENOUS at 08:28

## 2020-09-19 RX ADMIN — TEMAZEPAM 15 MG: 15 CAPSULE ORAL at 22:29

## 2020-09-19 ASSESSMENT — ACTIVITIES OF DAILY LIVING (ADL)
ADLS_ACUITY_SCORE: 12
ADLS_ACUITY_SCORE: 11
ADLS_ACUITY_SCORE: 12
ADLS_ACUITY_SCORE: 12
ADLS_ACUITY_SCORE: 11
ADLS_ACUITY_SCORE: 12

## 2020-09-19 NOTE — PROGRESS NOTES
[facility]    Medicine Progress Note - Hospitalist Service       Date of Admission:  9/15/2020  Date of Service: 09/19/2020    Assessment & Plan     Alfonso Kendrick is a 50 year old male with a past medical history significant for hairy cell leukemia (HCL) who presents with fevers. Patient was diagnosed with HCL in 2010.  He was treated with cladribine at the time and has been in remission.  More recently, he was unfortunately found to have recurrence of his leukemia confirmed on flow cytometry.  He is undergoing treatment with Rituxan x 4 along with cladribine.  He received  the cladribine about 3 weeks ago and currently getting weekly infusion of rituximab, with the last dose due this Friday.  Has been on Bactrim and acyclovir for prophylaxis.    He presented with 4 days onset of fevers, runny nose, occasional cough and also a recent rash. He was found to be neutropenic with high-grade fevers and admitted to the hospital. He reports that he had a similar admission 10 years ago and no infectious source was found at the time.     #Febrile neutropenia  #SIRS response secondary to the above  Patient had a high-grade fever up to 103 on admission, white cells of 0.2, febrile and tachycardic with meeting SIRS criteria.  Cladribine can induce fevers mediated through a cytokine storm, although he is a few weeks out from it, another possibility would be tumor fever and certainly this could be noninfectious in etiology.  Nevertheless given his neutropenia, he needs to be covered for infectious sources at this time as well until infection is ruled out and his fevers improved.  Does have a PICC line in place.  COVID-19 testing negative. Chest x-ray, UA negative    -Continue IV Piperacillin/Tazobactam. Stopped vancomycin   -Continue acyclovir, hold Bactrim for now  -Follow blood cultures  --Discussed with oncology, he will stay neutropenic for a while, so the goal would be improvement in fevers prior to  discharge  -Requested ID consultation    --> Ongoing fevers, although the fever curve is improved since admission.  His white count is improved from 0.2-0.8, he is neutropenic.  Did have a fever of 101.1 today, he feels well otherwise and desires going home, although discussed waiting input from oncology and infectious disease.  In the meanwhile will obtain CT of the sinuses since that seems to be his predominant complaint, with sinus pressure, stuffy nose.    #Pancytopenia.  Secondary to recent chemotherapy.  Monitor cell counts.  Discussed indications for RBC transfusion and he agreed to hold off on that.       #Previous history of DVT associated with PICC line.  Patient is currently on Xarelto prophylactically.  He denies having any DVT recently.  Resumed     #Generalized rash.  Patient reports that this is attributed to the cladribine.  Could also be related to the Bactrim. Monitor.    -Continue to hold the Bactrim, monitor the rash, plan for outpatient Derm.  Patient reports the rash is lighter now     #Mild hyponatremia. Resolved    #GLADYS.    Patient with increasing creatinine to 1.55  --Now improved to 1.3 range.  Discussed sparing use of ibuprofen and minimizing NSAID as though could exacerbate GLADYS.  Patient is understanding although he does desire to continue ibuprofen as that helped most with his fevers       Diet: Combination Diet Regular Diet Adult    DVT Prophylaxis: Xarelto  Moralez Catheter: not present  Code Status: Full Code      Disposition Plan   Expected discharge: tbd', recommended to prior living arrangement once fevers staying below 100/overall fever curve improving, cleared by ID and oncology.  He is likely to stay neutropenic for a while  Entered: Edna Louis MD 09/19/2020, 1:28 PM       The patient's care was discussed with the Bedside Nurse and Patient.    Edna Louis MD  Hospitalist Service  [facility]  [facility]    ______________________________________________________________________    Interval History   Chart reviewed and patient seen. Case discussed with nursing staff.     Patient feels quite well for the most part, up and about, tolerating oral intake, feels like his strength is good, no sign of bleeding, continues to have fevers, other than the fever he feels pretty well, does note some sinus congestion and sinus pressure feeling.  No other focal complaints.  Looking forward to going home soon      Data reviewed today: I reviewed all medications, new labs and imaging results over the last 24 hours. I personally reviewed    Physical Exam   Vital Signs: Temp: 98.8  F (37.1  C) Temp src: Oral BP: 110/64 Pulse: 110   Resp: 20 SpO2: 96 % O2 Device: None (Room air)    Weight: 251 lbs 3.2 oz    GENERAL:  Awake and alert, Comfortable appearing, No acute distress.  PSYCH: Pleasant, Cooperative, appropriate affect, no hallucinations   EYES: EOMI, conjunctiva clear, no redness noted  HEENT:  Head is normocephalic, atraumatic, Neck is Supple, trachea is midline   CARDIOVASCULAR: Regular rate and rhythm, Normal S1, S2, no loud murmurs, no rubs or gallops.  Tachycardic  PULMONARY:  Clear to auscultation bilaterally with good entry on both sides  CHEST: Good inspiratory effort bilaterally   GI: Abdomen is soft, non tender, non-distended, normal bowel sounds. No rebound or guarding   SKIN:  Dry, warm to touch.  Generalized macular rash, appears lighter   EXTREMITIES:  Good capillary refill with signs of adequate peripheral perfusion. No peripheral edema   Neuro: Awake and oriented x 3. No focal weakness or numbness is noted. Moving all extremities with good strength, Grossly non-focal.   MSK: Good range of motion in all major joints of upper and lower extremity, No acute joint synovitis of the major joints is noted.        Data   Recent Labs   Lab 09/19/20  0700 09/18/20  0650 09/17/20  0632 09/16/20  0530 09/15/20  1714    WBC 0.8* 0.5* 0.3* 0.2* 0.2*   HGB 7.3* 7.0* 7.0* 8.1* 9.2*   * 104* 101* 101* 97   PLT 63* 56* 53* 74* 86*   INR  --   --   --   --  1.79*   NA  --  138 137 133 132*   POTASSIUM  --  3.8 3.8 3.8 4.1   CHLORIDE  --  108 108 102 102   CO2  --  23 23 24 25   BUN  --  21 18 23 24   CR 1.36* 1.35* 1.55* 1.40* 1.21   ANIONGAP  --  7 6 7 5   TAMICA  --  7.4* 7.6* 7.4* 8.3*   GLC  --  111* 119* 122* 145*   ALBUMIN  --   --   --   --  2.6*   PROTTOTAL  --   --   --   --  6.4*   BILITOTAL  --   --   --   --  0.7   ALKPHOS  --   --   --   --  64   ALT  --   --   --   --  40   AST  --   --   --   --  76*       No results found for this or any previous visit (from the past 24 hour(s)).  Medications     - MEDICATION INSTRUCTIONS -         sodium chloride 0.9%  1,000 mL Intravenous Once     acyclovir  400 mg Oral Q12H     omeprazole  20 mg Oral Daily     piperacillin-tazobactam  3.375 g Intravenous Q6H     rivaroxaban ANTICOAGULANT  10 mg Oral Daily     sodium chloride (PF)  10 mL Intracatheter Q7 Days     sodium chloride (PF)  3 mL Intracatheter Q8H

## 2020-09-19 NOTE — PLAN OF CARE
End of Shift Summary  For vital signs and complete assessments, please see documentation flowsheets.     Pertinent assessments: Tmax 99.7. Tachycardic at times. PICC saline locked between antibiotics. Up independent.    Major Shift Events: Triggered SIRS protocol, lactic 1.2. CT of sinuses done this afternoon.     Treatment Plan: Zosyn, Acyclovir, symptom management, monitor CBC.    Bedside Nurse: Carmencita Lewis RN

## 2020-09-19 NOTE — PROGRESS NOTES
Oncology/Hematology Follow Up Note:    Assessment and Plan:    Alfonso Kendrick is a 50 year old male patient admitted 9/15, with neutropenic fevers      1.Hairy cell Leukemia  -Hairy cell leukemia diagnosed in 2010 treated with cladribine when he had pancytopenia and splenomegaly.  - Recurrence noted in August 2020 with neutropenia and thrombocytopenia and resumed treatment with cladribine and weekly rituximab so far has had 3 of 4 weeks of rituximab  -Received cladribine is a 5-day infusion from August 19 through August 24  -Received G-CSF or Neulasta on August 24.     PLAN:   - last dose of Ruxience( biosimilar) on 9/17, will hold for now till improvement  - No futher GCSf indicated as received.      2. Neutropenic fever, T max 102.7 F at 6 am on 9/17( better since then)  - has had COVID test done once as Op and negative.  -Covid-19 test negative again on admission.  - Agree with broad-spectrum antibiotics and blood cultures and fever work-up is done  -This could also be an immune reaction/ cytokine storm from his hairy cell and treatment with cladribine and Rituxan  -He had a very similar picture to this in 2010 when treated  - Toradol helped with fever     PLAN:  -Continue  pip/tazo  - generally would like fever curve to continue down and see some improvement in ANC (0 today) pt wants to go home, I advised to check with ID for their recommendations and when comfortable to treat as outpatient (?outpatient IV ATBx).  - Continue Acyclovir.  - With rash, hold bactrim     3. Rash,   - Extensive, erythematous maculopapular rash covering, large areas on lower extremities arms and trunk.  Non blanching.  - Presumed from drug cladrabine vs immune reaction post caldribine.      PLAN: OP derm, he is asymptomatic and rash is better per him     4. FULL CODE     5. DVT prophylaxis  - On rivoroxban 10mg PO daily for PICC line , previous DVT with PICC     PLAN: continue with PLT> 50 k    6. Anemia  HB at 7, but no s/s, hold  PRBC               Subjective:     He feels so much better, asking if he can go home, had 101 earlier this am, some sweats when fever breaks  No focal symptoms of infection  Rash improving but still significant.  Scheduled Medications:  Reviewed active medications    Labs:  CBC RESULTS:   Recent Labs   Lab Test 09/18/20  0650 09/17/20  0632 09/16/20  0530   WBC 0.5* 0.3* 0.2*   HGB 7.0* 7.0* 8.1*   HCT 22.1* 21.4* 24.9*   * 101* 101*   PLT 56* 53* 74*       CMP  Recent Labs   Lab 09/19/20  0700 09/18/20  0650 09/17/20  0632 09/16/20  0530 09/15/20  1714   NA  --  138 137 133 132*   POTASSIUM  --  3.8 3.8 3.8 4.1   CHLORIDE  --  108 108 102 102   CO2  --  23 23 24 25   ANIONGAP  --  7 6 7 5   GLC  --  111* 119* 122* 145*   BUN  --  21 18 23 24   CR 1.36* 1.35* 1.55* 1.40* 1.21   GFRESTIMATED 61 61 52* 58* 70   GFRESTBLACK 70 71 60* 68 81   TAMICA  --  7.4* 7.6* 7.4* 8.3*   MAG  --   --   --  1.7  --    PROTTOTAL  --   --   --   --  6.4*   ALBUMIN  --   --   --   --  2.6*   BILITOTAL  --   --   --   --  0.7   ALKPHOS  --   --   --   --  64   AST  --   --   --   --  76*   ALT  --   --   --   --  40       INR  Recent Labs   Lab 09/15/20  1714   INR 1.79*       Objective/Physical Exam:  Blood pressure 105/58, pulse 110, temperature 98.9  F (37.2  C), temperature source Oral, resp. rate 20, height 1.829 m (6'), weight 113.9 kg (251 lb 3.2 oz), SpO2 97 %.  General appearance:alert, oriented, no acute distress  RESP no distress  ABD soft NT.  Ext: Rash worse from Wed but better per patient, now on legs, arms a and a few on trunk

## 2020-09-19 NOTE — PLAN OF CARE
End of Shift Summary  For vital signs and complete assessments, please see documentation flowsheets.     Pertinent assessments: A&O x4, up independent. T-max: 100.3, tylenol given. HR: tachy. All other vitals stable. Anxious about medical care/situation. Needing frequent emotional support. PICC s/L. Generalized rash throughout body, looking better per patient. Triggered sepsis, lactic 1.3. WBC: 0.5, hemoglobin: 7.0, Blood cultures negative so far. Calcium: 7.4.  Major Shift Events: Reports being frustrated about lack of communication regarding care, reassurance and explanation of care communicated to patient.   Treatment Plan: IV zosyn, monitor temps

## 2020-09-19 NOTE — PROGRESS NOTES
[facility]  Infectious Disease Progress Note          Assessment and Plan:   IMPRESSION:   1.  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   2.  Rash, felt likely related to his chemotherapy agent, which caused a rash on his last cycle 10 years ago (cladribine), also getting Rituxan.  Has been on Septra prophylaxis, so that is a conceivable alternative cause to rash.   3.  Acute fever and neutropenia, fully neutropenic at this point.  No localizing symptoms and so far, cultures negative.  Continued fever on Zosyn so far.   4.  Prior PICC line-related deep venous thrombosis.      RECOMMENDATIONS:   1.  Agree with holding Septra.  He is getting acyclovir prophylactically as well.   2.  Continue Zosyn for now. T down  3.  Follow fever, clinical symptoms and white count.  If continued neutropenia with ongoing fever, potentially expand and alter treatment, as well as further workup if any symptoms develop.   4.  If fever resolves and neutropenia resolves, minimal treatment should be needed.   Still near 0 PMNs, and active fever, not ready for disposition , discussed in detail with pt  If felt going to have prolonged neutropenia, likely add fungal coverage and maybe to po quinolone             Interval History:   no new complaints and doing well; no cp, sob, n/v/d, or abd pain. T 101 this AM, cxs neg, some WBC to 800 but only 32 PMNs              Medications:       sodium chloride 0.9%  1,000 mL Intravenous Once     acyclovir  400 mg Oral Q12H     omeprazole  20 mg Oral Daily     piperacillin-tazobactam  3.375 g Intravenous Q6H     rivaroxaban ANTICOAGULANT  10 mg Oral Daily     sodium chloride (PF)  10 mL Intracatheter Q7 Days     sodium chloride (PF)  3 mL Intracatheter Q8H                  Physical Exam:   Blood pressure 110/64, pulse 110, temperature 98.8  F (37.1  C), temperature source Oral, resp. rate  20, height 1.829 m (6'), weight 113.9 kg (251 lb 3.2 oz), SpO2 96 %.  Wt Readings from Last 2 Encounters:   09/15/20 113.9 kg (251 lb 3.2 oz)   08/17/20 117 kg (258 lb)     Vital Signs with Ranges  Temp:  [98.5  F (36.9  C)-101.1  F (38.4  C)] 98.8  F (37.1  C)  Pulse:  [110-121] 110  Resp:  [18-28] 20  BP: ()/(51-64) 110/64  SpO2:  [93 %-97 %] 96 %    Constitutional: Awake, alert, cooperative, no apparent distress   Lungs: Clear to auscultation bilaterally, no crackles or wheezing   Cardiovascular: Regular rate and rhythm, normal S1 and S2, and no murmur noted   Abdomen: Normal bowel sounds, soft, non-distended, non-tender   Skin: ? Sl better rashes, no cyanosis, no edema   Other:           Data:   All microbiology laboratory data reviewed.  Recent Labs   Lab Test 09/19/20  0700 09/18/20  0650 09/17/20  0632   WBC 0.8* 0.5* 0.3*   HGB 7.3* 7.0* 7.0*   HCT 22.7* 22.1* 21.4*   * 104* 101*   PLT 63* 56* 53*     Recent Labs   Lab Test 09/19/20  0700 09/18/20  0650 09/17/20  0632   CR 1.36* 1.35* 1.55*     No lab results found.  Recent Labs   Lab Test 09/15/20  1714 09/15/20  1712   CULT No growth after 4 days No growth after 4 days

## 2020-09-20 LAB
ANION GAP SERPL CALCULATED.3IONS-SCNC: 5 MMOL/L (ref 3–14)
BASOPHILS # BLD AUTO: 0 10E9/L (ref 0–0.2)
BASOPHILS NFR BLD AUTO: 0 %
BUN SERPL-MCNC: 18 MG/DL (ref 7–30)
CALCIUM SERPL-MCNC: 7.2 MG/DL (ref 8.5–10.1)
CHLORIDE SERPL-SCNC: 104 MMOL/L (ref 94–109)
CO2 SERPL-SCNC: 27 MMOL/L (ref 20–32)
CREAT SERPL-MCNC: 1.37 MG/DL (ref 0.66–1.25)
DACRYOCYTES BLD QL SMEAR: SLIGHT
DIFFERENTIAL METHOD BLD: ABNORMAL
EOSINOPHIL # BLD AUTO: 0.1 10E9/L (ref 0–0.7)
EOSINOPHIL NFR BLD AUTO: 8 %
ERYTHROCYTE [DISTWIDTH] IN BLOOD BY AUTOMATED COUNT: 13.8 % (ref 10–15)
GFR SERPL CREATININE-BSD FRML MDRD: 60 ML/MIN/{1.73_M2}
GLUCOSE SERPL-MCNC: 123 MG/DL (ref 70–99)
HCT VFR BLD AUTO: 22.6 % (ref 40–53)
HGB BLD-MCNC: 7.4 G/DL (ref 13.3–17.7)
LYMPHOCYTES # BLD AUTO: 1.2 10E9/L (ref 0.8–5.3)
LYMPHOCYTES NFR BLD AUTO: 77 %
MCH RBC QN AUTO: 33.2 PG (ref 26.5–33)
MCHC RBC AUTO-ENTMCNC: 32.7 G/DL (ref 31.5–36.5)
MCV RBC AUTO: 101 FL (ref 78–100)
MONOCYTES # BLD AUTO: 0.1 10E9/L (ref 0–1.3)
MONOCYTES NFR BLD AUTO: 6 %
NEUTROPHILS # BLD AUTO: 0.1 10E9/L (ref 1.6–8.3)
NEUTROPHILS NFR BLD AUTO: 9 %
PLATELET # BLD AUTO: 56 10E9/L (ref 150–450)
PLATELET # BLD EST: ABNORMAL 10*3/UL
POTASSIUM SERPL-SCNC: 3.7 MMOL/L (ref 3.4–5.3)
RBC # BLD AUTO: 2.23 10E12/L (ref 4.4–5.9)
RBC INCLUSIONS BLD: SLIGHT
SODIUM SERPL-SCNC: 136 MMOL/L (ref 133–144)
WBC # BLD AUTO: 1.5 10E9/L (ref 4–11)

## 2020-09-20 PROCEDURE — 99232 SBSQ HOSP IP/OBS MODERATE 35: CPT | Performed by: INTERNAL MEDICINE

## 2020-09-20 PROCEDURE — 80048 BASIC METABOLIC PNL TOTAL CA: CPT | Performed by: INTERNAL MEDICINE

## 2020-09-20 PROCEDURE — 12000000 ZZH R&B MED SURG/OB

## 2020-09-20 PROCEDURE — 25000132 ZZH RX MED GY IP 250 OP 250 PS 637: Performed by: INTERNAL MEDICINE

## 2020-09-20 PROCEDURE — 25000128 H RX IP 250 OP 636: Performed by: INTERNAL MEDICINE

## 2020-09-20 PROCEDURE — 85025 COMPLETE CBC W/AUTO DIFF WBC: CPT | Performed by: INTERNAL MEDICINE

## 2020-09-20 RX ADMIN — TEMAZEPAM 15 MG: 15 CAPSULE ORAL at 23:38

## 2020-09-20 RX ADMIN — OMEPRAZOLE 20 MG: 20 CAPSULE, DELAYED RELEASE ORAL at 09:19

## 2020-09-20 RX ADMIN — MELATONIN TAB 3 MG 3 MG: 3 TAB at 23:40

## 2020-09-20 RX ADMIN — ACYCLOVIR 400 MG: 400 TABLET ORAL at 22:27

## 2020-09-20 RX ADMIN — TAZOBACTAM SODIUM AND PIPERACILLIN SODIUM 3.38 G: 375; 3 INJECTION, SOLUTION INTRAVENOUS at 17:01

## 2020-09-20 RX ADMIN — RIVAROXABAN 10 MG: 10 TABLET, FILM COATED ORAL at 09:19

## 2020-09-20 RX ADMIN — TAZOBACTAM SODIUM AND PIPERACILLIN SODIUM 3.38 G: 375; 3 INJECTION, SOLUTION INTRAVENOUS at 22:27

## 2020-09-20 RX ADMIN — TAZOBACTAM SODIUM AND PIPERACILLIN SODIUM 3.38 G: 375; 3 INJECTION, SOLUTION INTRAVENOUS at 02:55

## 2020-09-20 RX ADMIN — ACYCLOVIR 400 MG: 400 TABLET ORAL at 11:35

## 2020-09-20 RX ADMIN — TAZOBACTAM SODIUM AND PIPERACILLIN SODIUM 3.38 G: 375; 3 INJECTION, SOLUTION INTRAVENOUS at 09:19

## 2020-09-20 ASSESSMENT — ACTIVITIES OF DAILY LIVING (ADL)
ADLS_ACUITY_SCORE: 11

## 2020-09-20 NOTE — PLAN OF CARE
To Do:  End of Shift Summary  For vital signs and complete assessments, please see documentation flowsheets.     Pertinent assessments: A&O x4, up independent. T-max: 100.5, tylenol given. All other vitals stable. No pain/nausea reported. Rash looking better today. IS instructions provided to patient, tolerating well. Good appetite.  Major Shift Events: Alteplase for PICC occlusion, effective. Sister Sheila updated regarding POC.  Treatment Plan: Zosyn, Acyclovir. Monitor temps & labs

## 2020-09-20 NOTE — PROGRESS NOTES
[facility]    Medicine Progress Note - Hospitalist Service  Date of Admission:  9/15/2020  Date of Service: 09/20/2020    Assessment & Plan     Alfonso Kendrick is a 50 year old male with a past medical history significant for hairy cell leukemia (HCL) who presents with fevers. Patient was diagnosed with HCL in 2010.  He was treated with cladribine at the time and has been in remission.  More recently, he was unfortunately found to have recurrence of his leukemia confirmed on flow cytometry.  He is undergoing treatment with Rituxan x 4 along with cladribine.  He received  the cladribine about 3 weeks ago and currently getting weekly infusion of rituximab, with the last dose due this Friday.  Has been on Bactrim and acyclovir for prophylaxis.    He presented with 4 days onset of fevers, runny nose, occasional cough and also a recent rash. He was found to be neutropenic with high-grade fevers and admitted to the hospital. He reports that he had a similar admission 10 years ago and no infectious source was found at the time.     1. Febrile neutropenia.  2. SIRS response secondary to the above  Patient had a high-grade fever up to 103 on admission, white cells of 0.2, febrile and tachycardic with meeting SIRS criteria. Cladribine can induce fevers mediated through a cytokine storm, although he is a few weeks out from it, another possibility would be tumor fever and certainly this could be noninfectious in etiology.  Nevertheless given his neutropenia, he needs to be covered for infectious sources at this time as well until infection is ruled out and his fevers improved.  Does have a PICC line in place.  COVID-19 testing negative. Chest x-ray, UA negative    -Continue IV Piperacillin/Tazobactam. Off Vancomycin.  -Continue Acyclovir. He was on Bactrim for prophylaxis currently on hold.  -Blood cultures so far negative.  UA normal  -My colleague discussed with oncologist, stated that he will stay neutropenic  for a while, so the goal would be improvement in fevers prior to discharge  -Infectious disease consulted.  -Fever seems to be improving, T-max last 24-hour is 100.5.  Did not take Tylenol since his last febrile episode.  The fever curve is overall improving since admission.    -WBC increased from 0.8 to 1.5.  -He feels well overall, no new issues.  -CT of the sinuses done, no sign of infection.    3. Pancytopenia.  Secondary to recent chemotherapy.    -Monitor CBC.  -Platelets 56 hemoglobin 7.4.   -No need for transfusion at this point.  -No sign of bleeding from any site     4. Previous history of DVT associated with PICC line.    -Continue Xarelto. He denies having any DVT recently.  Resumed     5. Generalized rash.  Patient reports that this is attributed to the cladribine.  Could also be related to the Bactrim. Monitor.    -Continue to hold the Bactrim, monitor the rash, plan for outpatient Derm.  Patient reports the rash is lighter now     6. Mild hyponatremia. Resolved    7. GLADYS.    Patient with increasing creatinine to 1.55.  -Improved creatinine 1.37 today, no significant drop from yesterday.  -Advised to use Tylenol first for fever and to avoid ibuprofen as much as possible.  He understands it and desires to continue low-dose ibuprofen for now    Diet: Combination Diet Regular Diet Adult    DVT Prophylaxis: Xarelto  Moralez Catheter: not present  Code Status: Full Code      Disposition Plan   Expected discharge: To be determined.  Monitor fever curve, and also neutropenia.  Can be discharged when okay with oncology and infectious disease.  Expect 1-2 nights in the hospital at least.  Patient is aware that neutropenia may be ongoing for sometimes his underlying condition.  He is likely to stay neutropenic for a while  Entered: Judd Gonzalez MD 09/20/2020, 12:26 PM     The patient's care was discussed with the Bedside Nurse and Patient.    Judd Gonzalez MD  Hospitalist Service  [place]  [facility]    ______________________________________________________________________    Interval History   Patient seen and examined, assumed care today, overnight event reviewed, chart reviewed, feels better, no fever or chills today, her current fever is decreasing overall, no nausea or vomiting, tolerating oral intake, his energy level is better  Looking forward to going home soon    Data reviewed today: I reviewed all medications, new labs and imaging results over the last 24 hours. I personally reviewed    Physical Exam   Vital Signs: Temp: 98.4  F (36.9  C) Temp src: Oral BP: 107/65 Pulse: 101   Resp: 16 SpO2: 95 % O2 Device: None (Room air)    Weight: 251 lbs 3.2 oz    GENERAL:  Awake and alert, Comfortable appearing, No acute distress.  PSYCH: Pleasant, Cooperative, appropriate affect, no hallucinations   EYES: EOMI, conjunctiva clear, no redness noted  HEENT:  Head is normocephalic, atraumatic, Neck is Supple, trachea is midline   CARDIOVASCULAR: Regular rate and rhythm, Normal S1, S2, no loud murmurs, no rubs or gallops.  Tachycardic  PULMONARY:  Clear to auscultation bilaterally with good entry on both sides  CHEST: Good inspiratory effort bilaterally   GI: Abdomen is soft, non tender, non-distended, normal bowel sounds. No rebound or guarding   SKIN:  Dry, warm to touch.  Generalized macular rash, appears lighter   EXTREMITIES:  Good capillary refill with signs of adequate peripheral perfusion. No peripheral edema   Neuro: Awake and oriented x 3. No focal weakness or numbness is noted. Moving all extremities with good strength, Grossly non-focal.   MSK: Good range of motion in all major joints of upper and lower extremity, No acute joint synovitis of the major joints is noted.        Data   Recent Labs   Lab 09/20/20  0630 09/19/20  0700 09/18/20  0650 09/17/20  0632  09/15/20  1714   WBC 1.5* 0.8* 0.5* 0.3*   < > 0.2*   HGB 7.4* 7.3* 7.0* 7.0*   < > 9.2*   * 101* 104* 101*   < > 97   PLT 56*  63* 56* 53*   < > 86*   INR  --   --   --   --   --  1.79*     --  138 137   < > 132*   POTASSIUM 3.7  --  3.8 3.8   < > 4.1   CHLORIDE 104  --  108 108   < > 102   CO2 27  --  23 23   < > 25   BUN 18  --  21 18   < > 24   CR 1.37* 1.36* 1.35* 1.55*   < > 1.21   ANIONGAP 5  --  7 6   < > 5   TAMICA 7.2*  --  7.4* 7.6*   < > 8.3*   *  --  111* 119*   < > 145*   ALBUMIN  --   --   --   --   --  2.6*   PROTTOTAL  --   --   --   --   --  6.4*   BILITOTAL  --   --   --   --   --  0.7   ALKPHOS  --   --   --   --   --  64   ALT  --   --   --   --   --  40   AST  --   --   --   --   --  76*    < > = values in this interval not displayed.       Recent Results (from the past 24 hour(s))   CT Sinus w/o Contrast    Narrative    CT OF THE PARANASAL SINUSES WITHOUT CONTRAST 9/19/2020 1:56 PM     COMPARISON: None    HISTORY: Neutropenic fever, consistent sinus symptoms.    TECHNIQUE:  Axial noncontrast CT images of the paranasal sinuses were  acquired with the patient in the supine position. Coronal  reconstructions were created from the axial source images.    FINDINGS:  Frontal sinuses:   Normal.      Ethmoid sinuses:   Normal.     Right maxillary sinus:   Normal.  The ostiomeatal unit is normal with  a patent infundibulum.     Left maxillary sinus:  Minimal polypoid mucosal thickening.  The  ostiomeatal unit is normal with a patent infundibulum.      Sphenoid sinus:   Normal.     Nasal septum:  Deviated to the right.    Turbinates and nasal cavity:   Normal.     Laminae papyracea and cribriform plate: Normal.     Other findings: The orbits, sella, maxillary alveolus and nasopharynx  appear normal. The bony temporomandibular joints are within normal  limits.      Impression    IMPRESSION:    1. Right radial nasal septal deviation.  2. Otherwise, normal sinus CT. No acute sinusitis.        Radiation dose for this scan was reduced using automated exposure  control, adjustment of the mA and/or kV according to patient  size, or  iterative reconstruction technique    CRISTIAN HSU MD     Medications     - MEDICATION INSTRUCTIONS -         sodium chloride 0.9%  1,000 mL Intravenous Once     acyclovir  400 mg Oral Q12H     omeprazole  20 mg Oral Daily     piperacillin-tazobactam  3.375 g Intravenous Q6H     rivaroxaban ANTICOAGULANT  10 mg Oral Daily     sodium chloride (PF)  10 mL Intracatheter Q7 Days     sodium chloride (PF)  3 mL Intracatheter Q8H

## 2020-09-20 NOTE — PROGRESS NOTES
[facility]  Infectious Disease Progress Note          Assessment and Plan:   IMPRESSION:   1.  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   2.  Rash, felt likely related to his chemotherapy agent, which caused a rash on his last cycle 10 years ago (cladribine), also getting Rituxan.  Has been on Septra prophylaxis, so that is a conceivable alternative cause to rash.   3.  Acute fever and neutropenia, fully neutropenic at this point.  No localizing symptoms and so far, cultures negative.  Continued fever on Zosyn so far.   4.  Prior PICC line-related deep venous thrombosis.      RECOMMENDATIONS:   1.  Agree with holding Septra.  He is getting acyclovir prophylactically as well.     2.  Minimal fever,neg cxs, no focal  clinical symptoms and white count 1500 140 PMNs.    OK home 1 week l;ev 500 daily, Follow-up CBC during week and back if major fever, issues             Interval History:   no new complaints and doing well; no cp, sob, n/v/d, or abd pain. T 100.5 last 30 hrs down now, cxs neg, some WBC 1500 135 PMNs              Medications:       sodium chloride 0.9%  1,000 mL Intravenous Once     acyclovir  400 mg Oral Q12H     omeprazole  20 mg Oral Daily     piperacillin-tazobactam  3.375 g Intravenous Q6H     rivaroxaban ANTICOAGULANT  10 mg Oral Daily     sodium chloride (PF)  10 mL Intracatheter Q7 Days     sodium chloride (PF)  3 mL Intracatheter Q8H                  Physical Exam:   Blood pressure 107/65, pulse 101, temperature 98.4  F (36.9  C), temperature source Oral, resp. rate 16, height 1.829 m (6'), weight 113.9 kg (251 lb 3.2 oz), SpO2 95 %.  Wt Readings from Last 2 Encounters:   09/15/20 113.9 kg (251 lb 3.2 oz)   08/17/20 117 kg (258 lb)     Vital Signs with Ranges  Temp:  [98.2  F (36.8  C)-100.5  F (38.1  C)] 98.4  F (36.9  C)  Pulse:  [101-114] 101  Resp:  [16-24] 16  BP:  (104-116)/(57-67) 107/65  SpO2:  [94 %-100 %] 95 %    Constitutional: Awake, alert, cooperative, no apparent distress   Lungs: Clear to auscultation bilaterally, no crackles or wheezing   Cardiovascular: Regular rate and rhythm, normal S1 and S2, and no murmur noted   Abdomen: Normal bowel sounds, soft, non-distended, non-tender   Skin: ? Sl better rashes, no cyanosis, no edema   Other:           Data:   All microbiology laboratory data reviewed.  Recent Labs   Lab Test 09/20/20  0630 09/19/20  0700 09/18/20  0650   WBC 1.5* 0.8* 0.5*   HGB 7.4* 7.3* 7.0*   HCT 22.6* 22.7* 22.1*   * 101* 104*   PLT 56* 63* 56*     Recent Labs   Lab Test 09/20/20  0630 09/19/20  0700 09/18/20  0650   CR 1.37* 1.36* 1.35*     No lab results found.  Recent Labs   Lab Test 09/15/20  1714 09/15/20  1712   CULT No growth after 5 days No growth after 5 days

## 2020-09-20 NOTE — PROGRESS NOTES
Oncology/Hematology Follow Up Note:    Assessment and Plan:    Alfonso Kendrick is a 50 year old male patient admitted 9/15, with neutropenic fevers      1.Hairy cell Leukemia  -Hairy cell leukemia diagnosed in 2010 treated with cladribine when he had pancytopenia and splenomegaly.  - Recurrence noted in August 2020 with neutropenia and thrombocytopenia and resumed treatment with cladribine and weekly rituximab so far has had 3 of 4 weeks of rituximab  -Received cladribine is a 5-day infusion from August 19 through August 24  -Received G-CSF or Neulasta on August 24.     PLAN:   - last dose of Ruxience( biosimilar) on 9/17, will hold for now till improvement  - No futher GCSf indicated as received.      2. Neutropenic fever, T max 102.7 F at 6 am on 9/17( better since then)  - has had COVID test done once as Op and negative.  -Covid-19 test negative again on admission.  - Agree with broad-spectrum antibiotics and blood cultures and fever work-up is done  -This could also be an immune reaction/ cytokine storm from his hairy cell and treatment with cladribine and Rituxan  -He had a very similar picture to this in 2010 when treated  - Toradol helped with fever     PLAN:  -Continue  Pip/tazo per ID  - generally would like fever curve to continue down and see some improvement in ANC (0.1 today) before discharge, will defer to ID  - Continue Acyclovir.  - With rash, hold bactrim     3. Rash,   - Extensive, erythematous maculopapular rash covering, large areas on lower extremities arms and trunk.  Non blanching.  - Presumed from drug cladrabine vs immune reaction post caldribine.      PLAN: OP derm, he is asymptomatic and rash is better per him     4. FULL CODE     5. DVT prophylaxis  - On rivoroxban 10mg PO daily for PICC line , previous DVT with PICC     PLAN: continue with PLT> 50 k    6. Anemia  HB at 7, but no s/s, hold PRBC               Subjective:     He feels better, occasional cough, no high fever, seem to trend  down  Rash improving but still significant.  Scheduled Medications:  Reviewed active medications    Labs:  CBC RESULTS:   Recent Labs   Lab Test 09/18/20  0650 09/17/20  0632 09/16/20  0530   WBC 0.5* 0.3* 0.2*   HGB 7.0* 7.0* 8.1*   HCT 22.1* 21.4* 24.9*   * 101* 101*   PLT 56* 53* 74*       CMP  Recent Labs   Lab 09/20/20  0630 09/19/20  0700 09/18/20  0650 09/17/20  0632 09/16/20  0530 09/15/20  1714     --  138 137 133 132*   POTASSIUM 3.7  --  3.8 3.8 3.8 4.1   CHLORIDE 104  --  108 108 102 102   CO2 27  --  23 23 24 25   ANIONGAP 5  --  7 6 7 5   *  --  111* 119* 122* 145*   BUN 18  --  21 18 23 24   CR 1.37* 1.36* 1.35* 1.55* 1.40* 1.21   GFRESTIMATED 60* 61 61 52* 58* 70   GFRESTBLACK 70 70 71 60* 68 81   TAMICA 7.2*  --  7.4* 7.6* 7.4* 8.3*   MAG  --   --   --   --  1.7  --    PROTTOTAL  --   --   --   --   --  6.4*   ALBUMIN  --   --   --   --   --  2.6*   BILITOTAL  --   --   --   --   --  0.7   ALKPHOS  --   --   --   --   --  64   AST  --   --   --   --   --  76*   ALT  --   --   --   --   --  40       INR  Recent Labs   Lab 09/15/20  1714   INR 1.79*       Objective/Physical Exam:  Blood pressure 104/57, pulse 103, temperature 98.2  F (36.8  C), temperature source Oral, resp. rate 16, height 1.829 m (6'), weight 113.9 kg (251 lb 3.2 oz), SpO2 94 %.  General appearance:alert, oriented, no acute distress  RESP no distress  ABD soft NT.  Ext: Rash on legs, arms a and a few on trunk

## 2020-09-21 VITALS
HEART RATE: 105 BPM | DIASTOLIC BLOOD PRESSURE: 55 MMHG | TEMPERATURE: 98.7 F | BODY MASS INDEX: 34.02 KG/M2 | OXYGEN SATURATION: 93 % | SYSTOLIC BLOOD PRESSURE: 102 MMHG | RESPIRATION RATE: 16 BRPM | WEIGHT: 251.2 LBS | HEIGHT: 72 IN

## 2020-09-21 LAB
BACTERIA SPEC CULT: NO GROWTH
BACTERIA SPEC CULT: NO GROWTH
BASOPHILS # BLD AUTO: 0 10E9/L (ref 0–0.2)
BASOPHILS NFR BLD AUTO: 1 %
DIFFERENTIAL METHOD BLD: ABNORMAL
EOSINOPHIL # BLD AUTO: 0 10E9/L (ref 0–0.7)
EOSINOPHIL NFR BLD AUTO: 2 %
ERYTHROCYTE [DISTWIDTH] IN BLOOD BY AUTOMATED COUNT: 13.5 % (ref 10–15)
HCT VFR BLD AUTO: 23.6 % (ref 40–53)
HGB BLD-MCNC: 7.6 G/DL (ref 13.3–17.7)
LYMPHOCYTES # BLD AUTO: 1.2 10E9/L (ref 0.8–5.3)
LYMPHOCYTES NFR BLD AUTO: 61 %
MACROCYTES BLD QL SMEAR: PRESENT
MCH RBC QN AUTO: 32.3 PG (ref 26.5–33)
MCHC RBC AUTO-ENTMCNC: 32.2 G/DL (ref 31.5–36.5)
MCV RBC AUTO: 100 FL (ref 78–100)
MONOCYTES # BLD AUTO: 0 10E9/L (ref 0–1.3)
MONOCYTES NFR BLD AUTO: 2 %
MYELOCYTES # BLD: 0 10E9/L
MYELOCYTES NFR BLD MANUAL: 2 %
NEUTROPHILS # BLD AUTO: 0.6 10E9/L (ref 1.6–8.3)
NEUTROPHILS NFR BLD AUTO: 32 %
OVALOCYTES BLD QL SMEAR: SLIGHT
PLATELET # BLD AUTO: 74 10E9/L (ref 150–450)
PLATELET # BLD EST: ABNORMAL 10*3/UL
RBC # BLD AUTO: 2.35 10E12/L (ref 4.4–5.9)
SPECIMEN SOURCE: NORMAL
SPECIMEN SOURCE: NORMAL
WBC # BLD AUTO: 1.9 10E9/L (ref 4–11)

## 2020-09-21 PROCEDURE — 99239 HOSP IP/OBS DSCHRG MGMT >30: CPT | Performed by: INTERNAL MEDICINE

## 2020-09-21 PROCEDURE — 85025 COMPLETE CBC W/AUTO DIFF WBC: CPT | Performed by: INTERNAL MEDICINE

## 2020-09-21 PROCEDURE — 25000132 ZZH RX MED GY IP 250 OP 250 PS 637: Performed by: INTERNAL MEDICINE

## 2020-09-21 PROCEDURE — 25000128 H RX IP 250 OP 636: Performed by: INTERNAL MEDICINE

## 2020-09-21 RX ORDER — LEVOFLOXACIN 500 MG/1
500 TABLET, FILM COATED ORAL DAILY
Qty: 7 TABLET | Refills: 0 | Status: SHIPPED | OUTPATIENT
Start: 2020-09-21 | End: 2020-09-28

## 2020-09-21 RX ADMIN — TAZOBACTAM SODIUM AND PIPERACILLIN SODIUM 3.38 G: 375; 3 INJECTION, SOLUTION INTRAVENOUS at 06:16

## 2020-09-21 RX ADMIN — ACYCLOVIR 400 MG: 400 TABLET ORAL at 11:47

## 2020-09-21 RX ADMIN — RIVAROXABAN 10 MG: 10 TABLET, FILM COATED ORAL at 09:14

## 2020-09-21 RX ADMIN — OMEPRAZOLE 20 MG: 20 CAPSULE, DELAYED RELEASE ORAL at 09:14

## 2020-09-21 ASSESSMENT — ACTIVITIES OF DAILY LIVING (ADL)
ADLS_ACUITY_SCORE: 11

## 2020-09-21 NOTE — DISCHARGE SUMMARY
[facility]  Discharge Summary  Name: Alfonso Kendrick    MRN: [id number]  YOB: 1970    Age: 50 year old  Date of Discharge:  9/21/2020 12:18 PM  Date of Admission: 9/15/2020  Primary Care Provider: Chuy Guzman  Discharge Physician:  David Bains MD  Discharging Service:  Hospitalist      Discharge Diagnoses:  Febrile neutropenia  SIRS response  Pancytopenia  Hairy cell leukemia  History of DVT associated with PICC line  Generalized drug rash  Mild hyponatremia  GLADYS     Hospital Course:  Summary of stay:   Alfonso Kendrick is a 50-year-old male with history of hairy cell leukemia previously in remission after treatment with cladribine in 2010, but more recently found to have recurrence on flow cytometry now undergoing cladribine and Rituxan therapy who was admitted on 9/15/2020 for 4 days of fevers, runny nose, cough, and rash.  He is found to be neutropenic along with high-grade fevers and was admitted for IV antibiotics.  Initially febrile and tachycardic consistent with SIRS response.  Initially was started on IV vancomycin and IV Zosyn.  Oncology infectious these were consulted.  Blood cultures were negative.  COVID19 PCR negative.  He received IV fluids for mild GLADYS.  He has significant diffuse maculopapular rash most prominent on the lower legs so his Bactrim prophylaxis is been on hold although it may be from his cladribine.  His pancytopenia is slowly improving with ANC up to 0.6.  His fevers have also for the most part resolved.   He will discharge home on additional 7 days of oral levofloxacin.  Continuing to hold Bactrim and follow-up with [facility] later this week.    Assessment/plan:  Neutropenic fever, SIRS response: Presenting with fevers 103  F, neutropenia, tachycardia consistent with SIRS criteria.  No clear source for infection.  Empirically on IV vancomycin and IV Zosyn.  COVID19 PCR negative.  He does have a PICC line in place on admission.  Chest  x-ray did not show any infiltrate.  CT of the sinuses was obtained for sinus congestion and runny nose that was negative for sinusitis.  Fever curve improved as his neutropenia.  -ID consulted  -Blood cultures no growth to date  -Transition IV Zosyn to oral levofloxacin for additional 7 days    Pancytopenia: Initially presenting with neutropenia along with anemia and thrombocytopenia.  Likely due to his hairy cell leukemia and treatment for this with cladribine and Rituxan.  Hemoglobin stable at 7 and platelet count rising up to 74.  Received Neulasta on August 24.  ANC now 0.6 at discharge.  Follow-up with [facility] later this week.    Hairy cell leukemia: Initially treated with cladribine in 2010 and then was in remission.  More recent recurrence confirmed on flow cytometry.  Follows with [facility].  Started treatment in August with cladribine and weekly rituximab, so far is at 3 to 4 weeks of rituximab.  Received 5-day cladribine infusion on August 19 through August 24.  Received Neulasta on August 24.  Follow-up with oncology later this week.    Maculopapular rash: Extensive erythematous maculopapular rash covering large areas of lower extremities and his trunk.  Reports having this previously with his cladribine treatment.  He is also on Bactrim prophylaxis.  Rash appears to be improving.  -Continue to hold Bactrim prophylaxis  -Follow-up with outpatient dermatology    GLADYS: Creatinine up to 1.55.  Improved some with IV fluids down to 1.3.  Recommended acetaminophen for fever.  Avoid ibuprofen as able.    Mild hyponatremia: Resolved with IV fluids.    History of DVT: History of DVT associate with PICC line.  Continue PTA Xarelto 10 mg at bedtime.       Discharge Disposition:  Discharged to home     Allergies:  No Known Allergies     Discharge Medications:   Current Discharge Medication List      CONTINUE these medications which have CHANGED    Details   levofloxacin (LEVAQUIN) 500 MG tablet  Take 1 tablet (500 mg) by mouth daily for 7 days For 7 days.  Qty: 7 tablet, Refills: 0    Associated Diagnoses: Febrile neutropenia (H)         CONTINUE these medications which have NOT CHANGED    Details   Acetaminophen 325 MG CAPS Take 325-650 mg by mouth every 4 hours as needed      acyclovir (ZOVIRAX) 400 MG tablet Take 400 mg by mouth every 12 hours      lactobacillus rhamnosus, GG, (CULTURELL) capsule Take 1 capsule by mouth daily      methylPREDNISolone (MEDROL DOSEPAK) 4 MG tablet therapy pack Take by mouth See Admin Instructions Follow Package Directions      MULTIVITAMIN TABS   OR Take 1 tablet by mouth daily   Refills: 0      prochlorperazine (COMPAZINE) 10 MG tablet Take 10 mg by mouth every 6 hours as needed for nausea or vomiting      rivaroxaban ANTICOAGULANT (XARELTO) 10 MG TABS tablet Take 10 mg by mouth daily      temazepam (RESTORIL) 15 MG capsule Take 15 mg by mouth nightly as needed for sleep      Vitamin D, Cholecalciferol, 25 MCG (1000 UT) TABS Take 1 tablet by mouth daily         STOP taking these medications       sulfamethoxazole-trimethoprim (BACTRIM DS) 800-160 MG tablet Comments:   Reason for Stopping:                Condition on Discharge:  Discharge condition: Stable   Discharge vitals: Blood pressure 102/55, pulse 105, temperature 98.7  F (37.1  C), temperature source Oral, resp. rate 16, height 1.829 m (6'), weight 113.9 kg (251 lb 3.2 oz), SpO2 93 %.   Code status on discharge: Full Code     History of Illness:  See detailed admission note for full details.    Physical Exam:  Blood pressure 102/55, pulse 105, temperature 98.7  F (37.1  C), temperature source Oral, resp. rate 16, height 1.829 m (6'), weight 113.9 kg (251 lb 3.2 oz), SpO2 93 %.  Wt Readings from Last 1 Encounters:   09/15/20 113.9 kg (251 lb 3.2 oz)     Constitutional: Awake, NAD   Eyes: sclera white   HEENT:  MMM  Respiratory:  lungs cta bilaterally, no crackles or wheeze  Cardiovascular: Slight regular tachycardia,  no murmur  GI: non-tender, not distended, bowel sounds present  Skin: no erythema surrounding PEG site  Musculoskeletal/extremities:  No edema  Neurologic: A&O, speech clear   Psychiatric: calm, cooperative, normal affect    Procedures other than Imaging:  None     Imaging:  Results for orders placed or performed during the hospital encounter of 09/15/20   XR Chest Port 1 View    Narrative    EXAM: XR CHEST PORT 1 VW  LOCATION: [facility]  DATE/TIME: 9/15/2020 5:11 PM    INDICATION: Fever, unknown source  COMPARISON: 11/14/2010      Impression    IMPRESSION: Right-sided PICC line with tip over atrial caval junction. No pneumothorax or pleural effusion. No focal consolidation. Cardiac silhouette within normal limits. No acute osseous abnormality.   CT Sinus w/o Contrast    Narrative    CT OF THE PARANASAL SINUSES WITHOUT CONTRAST 9/19/2020 1:56 PM     COMPARISON: None    HISTORY: Neutropenic fever, consistent sinus symptoms.    TECHNIQUE:  Axial noncontrast CT images of the paranasal sinuses were  acquired with the patient in the supine position. Coronal  reconstructions were created from the axial source images.    FINDINGS:  Frontal sinuses:   Normal.      Ethmoid sinuses:   Normal.     Right maxillary sinus:   Normal.  The ostiomeatal unit is normal with  a patent infundibulum.     Left maxillary sinus:  Minimal polypoid mucosal thickening.  The  ostiomeatal unit is normal with a patent infundibulum.      Sphenoid sinus:   Normal.     Nasal septum:  Deviated to the right.    Turbinates and nasal cavity:   Normal.     Laminae papyracea and cribriform plate: Normal.     Other findings: The orbits, sella, maxillary alveolus and nasopharynx  appear normal. The bony temporomandibular joints are within normal  limits.      Impression    IMPRESSION:    1. Right radial nasal septal deviation.  2. Otherwise, normal sinus CT. No acute sinusitis.        Radiation dose for this scan was reduced using automated  exposure  control, adjustment of the mA and/or kV according to patient size, or  iterative reconstruction technique    CRISTIAN HSU MD        Consultations:  Consultation during this admission received from infectious disease and oncology.       Recent Lab Results:  Recent Labs   Lab 09/21/20  0620 09/20/20  0630 09/19/20  0700   WBC 1.9* 1.5* 0.8*   HGB 7.6* 7.4* 7.3*   HCT 23.6* 22.6* 22.7*    101* 101*   PLT 74* 56* 63*     Recent Labs   Lab 09/15/20  1714 09/15/20  1712   CULT No growth No growth     Recent Labs   Lab 09/20/20  0630 09/19/20  0700 09/18/20  0650 09/17/20  0632 09/16/20  0530 09/15/20  1714     --  138 137 133 132*   POTASSIUM 3.7  --  3.8 3.8 3.8 4.1   CHLORIDE 104  --  108 108 102 102   CO2 27  --  23 23 24 25   ANIONGAP 5  --  7 6 7 5   *  --  111* 119* 122* 145*   BUN 18  --  21 18 23 24   CR 1.37* 1.36* 1.35* 1.55* 1.40* 1.21   GFRESTIMATED 60* 61 61 52* 58* 70   GFRESTBLACK 70 70 71 60* 68 81   TAMICA 7.2*  --  7.4* 7.6* 7.4* 8.3*   MAG  --   --   --   --  1.7  --    PROTTOTAL  --   --   --   --   --  6.4*   ALBUMIN  --   --   --   --   --  2.6*   BILITOTAL  --   --   --   --   --  0.7   ALKPHOS  --   --   --   --   --  64   AST  --   --   --   --   --  76*   ALT  --   --   --   --   --  40     Recent Labs   Lab 09/15/20  1714   LACT 1.6     Recent Labs   Lab 09/15/20  1803   COLOR Yellow   APPEARANCE Clear   URINEGLC 30*   URINEBILI Negative   URINEKETONE Trace*   SG 1.035   UBLD Small*   URINEPH 6.0   PROTEIN 300*   NITRITE Negative   LEUKEST Negative   RBCU 1   WBCU 4          Pending Results:    Unresulted Labs Ordered in the Past 30 Days of this Admission     No orders found from 8/16/2020 to 9/16/2020.         These results will be followed up by patient's primary care provider.    Discharge Instructions and Follow-Up:   Discharge Procedure Orders   Reason for your hospital stay   Order Comments: You were hospitalized for neutropenic fever.  No infectious source has  been found.  You will finish 1 week of oral levofloxacin for this.  Your blood counts are slowly improving.  Your bactrim medication has been stopped due to the rash that is slowly improving.     Follow-up and recommended labs and tests    Order Comments: Follow up with Dr. Maldonado in oncology clinic later this week.  Recommended CBC w/ diff at that time.    Follow up with an outpatient dermatologist at next available appointment for your rash.     Activity   Order Comments: Your activity upon discharge: activity as tolerated     Order Specific Question Answer Comments   Is discharge order? Yes      IV access   Order Comments: You are going home with the following vascular access device: PICC that you came in with.  Resume previous management recommendations.     Full Code     Order Specific Question Answer Comments   Code status determined by: Discussion with patient/ legal decision maker      Diet   Order Comments: Follow this diet upon discharge: Orders Placed This Encounter      Combination Diet Regular Diet Adult     Order Specific Question Answer Comments   Is discharge order? Yes            I, David Bains MD, personally saw the patient today and spent greater than 30 minutes discharging this patient.    David Bains MD

## 2020-09-21 NOTE — PROGRESS NOTES
Oncology/Hematology Follow Up Note:    Assessment and Plan:    Alfonso Kendrick is a 50 year old male patient admitted 9/15, with neutropenic fevers      1.Hairy cell Leukemia  -Hairy cell leukemia diagnosed in 2010 treated with cladribine when he had pancytopenia and splenomegaly.  - Recurrence noted in August 2020 with neutropenia and thrombocytopenia and resumed treatment with cladribine and weekly rituximab so far has had 3 of 4 weeks of rituximab  -Received cladribine is a 5-day infusion from August 19 through August 24  -Received G-CSF or Neulasta on August 24.     PLAN:   - last dose of Ruxience( biosimilar) on 9/17, will hold for now till improvement  - No futher GCSf indicated as received.      2. Neutropenic fever, T max 102.7 F at 6 am on 9/17( better since then)  - has had COVID test done once as Op and negative.  -Covid-19 test negative again on admission.  - Agree with broad-spectrum antibiotics and blood cultures and fever work-up is done  -This could also be an immune reaction/ cytokine storm from his hairy cell and treatment with cladribine and Rituxan  -He had a very similar picture to this in 2010 when treated  - Toradol helped with fever     PLAN:  -Continue  Pip/tazo per ID  - ANC improving, fever curve better, ok for home otday with PO levaquin  - Continue Acyclovir.  - With rash, hold bactrim     3. Rash,   - Extensive, erythematous maculopapular rash covering, large areas on lower extremities arms and trunk.  Non blanching.  - Presumed from drug cladrabine vs immune reaction post caldribine.  - Rash is better     PLAN: OP derm, he is asymptomatic and rash is better per him     4. FULL CODE     5. DVT prophylaxis  - On rivoroxban 10mg PO daily for PICC line , previous DVT with PICC     PLAN: continue with PLT> 50 k     6. Anemia  HB at 7, but no s/s, hold PRBC    7. Normal Ct sinuses  - ok for deongestants     Home today and Follow-up with me later this week.    D/w   [facility]     Subjective:     feeling much better, ready to go home, rash better, sinuses still bothering him      Labs:  All labs reviewed    CBC  Recent Labs   Lab 09/21/20  0620 09/20/20  0630 09/19/20  0700   WBC 1.9* 1.5* 0.8*   HGB 7.6* 7.4* 7.3*    101* 101*   PLT 74* 56* 63*       CMP  Recent Labs   Lab 09/20/20  0630 09/19/20  0700 09/18/20  0650 09/17/20  0632 09/16/20  0530 09/15/20  1714     --  138 137 133 132*   POTASSIUM 3.7  --  3.8 3.8 3.8 4.1   CHLORIDE 104  --  108 108 102 102   CO2 27  --  23 23 24 25   ANIONGAP 5  --  7 6 7 5   *  --  111* 119* 122* 145*   BUN 18  --  21 18 23 24   CR 1.37* 1.36* 1.35* 1.55* 1.40* 1.21   GFRESTIMATED 60* 61 61 52* 58* 70   GFRESTBLACK 70 70 71 60* 68 81   TAMICA 7.2*  --  7.4* 7.6* 7.4* 8.3*   MAG  --   --   --   --  1.7  --    PROTTOTAL  --   --   --   --   --  6.4*   ALBUMIN  --   --   --   --   --  2.6*   BILITOTAL  --   --   --   --   --  0.7   ALKPHOS  --   --   --   --   --  64   AST  --   --   --   --   --  76*   ALT  --   --   --   --   --  40       INR  Recent Labs   Lab 09/15/20  1714   INR 1.79*       Blood Culture  Recent Labs   Lab 09/15/20  1714 09/15/20  1712   CULT No growth No growth     Rash much  better    Terry Maldonado MD  [facility]  9/21/2020 8:53 AM

## 2020-09-21 NOTE — PLAN OF CARE
Pt up ind. VSS tachy. Tmax 99.6, no tyl given. On zosyn & acyclovir. Gen rash. Denies pain. Tamera reg diet, denies nausea, appetite good. PICC SL.

## 2020-10-06 ENCOUNTER — TRANSFERRED RECORDS (OUTPATIENT)
Dept: HEALTH INFORMATION MANAGEMENT | Facility: CLINIC | Age: 50
End: 2020-10-06

## 2020-10-30 ENCOUNTER — TRANSFERRED RECORDS (OUTPATIENT)
Dept: HEALTH INFORMATION MANAGEMENT | Facility: CLINIC | Age: 50
End: 2020-10-30

## 2020-11-22 ENCOUNTER — HEALTH MAINTENANCE LETTER (OUTPATIENT)
Age: 50
End: 2020-11-22

## 2020-12-21 ENCOUNTER — TRANSFERRED RECORDS (OUTPATIENT)
Dept: HEALTH INFORMATION MANAGEMENT | Facility: CLINIC | Age: 50
End: 2020-12-21

## 2021-04-19 ENCOUNTER — IMMUNIZATION (OUTPATIENT)
Dept: NURSING | Facility: CLINIC | Age: 51
End: 2021-04-19
Payer: COMMERCIAL

## 2021-04-19 PROCEDURE — 91300 PR COVID VAC PFIZER DIL RECON 30 MCG/0.3 ML IM: CPT

## 2021-04-19 PROCEDURE — 0001A PR COVID VAC PFIZER DIL RECON 30 MCG/0.3 ML IM: CPT

## 2021-05-10 ENCOUNTER — IMMUNIZATION (OUTPATIENT)
Dept: NURSING | Facility: CLINIC | Age: 51
End: 2021-05-10
Attending: INTERNAL MEDICINE
Payer: COMMERCIAL

## 2021-05-10 PROCEDURE — 0002A PR COVID VAC PFIZER DIL RECON 30 MCG/0.3 ML IM: CPT

## 2021-05-10 PROCEDURE — 91300 PR COVID VAC PFIZER DIL RECON 30 MCG/0.3 ML IM: CPT

## 2021-06-16 ENCOUNTER — TRANSFERRED RECORDS (OUTPATIENT)
Dept: HEALTH INFORMATION MANAGEMENT | Facility: CLINIC | Age: 51
End: 2021-06-16

## 2021-09-18 ENCOUNTER — HEALTH MAINTENANCE LETTER (OUTPATIENT)
Age: 51
End: 2021-09-18

## 2022-01-08 ENCOUNTER — HEALTH MAINTENANCE LETTER (OUTPATIENT)
Age: 52
End: 2022-01-08

## 2022-11-20 ENCOUNTER — HEALTH MAINTENANCE LETTER (OUTPATIENT)
Age: 52
End: 2022-11-20

## 2023-04-15 ENCOUNTER — HEALTH MAINTENANCE LETTER (OUTPATIENT)
Age: 53
End: 2023-04-15

## 2023-11-05 DIAGNOSIS — R05.1 ACUTE COUGH: Primary | ICD-10-CM

## 2023-11-05 RX ORDER — BENZONATATE 200 MG/1
200 CAPSULE ORAL 3 TIMES DAILY PRN
Qty: 30 CAPSULE | Refills: 0 | Status: SHIPPED | OUTPATIENT
Start: 2023-11-05

## 2024-06-22 ENCOUNTER — HEALTH MAINTENANCE LETTER (OUTPATIENT)
Age: 54
End: 2024-06-22